# Patient Record
Sex: MALE | Race: WHITE | URBAN - METROPOLITAN AREA
[De-identification: names, ages, dates, MRNs, and addresses within clinical notes are randomized per-mention and may not be internally consistent; named-entity substitution may affect disease eponyms.]

---

## 2020-02-28 ENCOUNTER — HISTORICAL (OUTPATIENT)
Dept: RADIOLOGY | Facility: HOSPITAL | Age: 45
End: 2020-02-28

## 2020-12-08 ENCOUNTER — HISTORICAL (OUTPATIENT)
Dept: RADIOLOGY | Facility: HOSPITAL | Age: 45
End: 2020-12-08

## 2024-04-08 ENCOUNTER — HOSPITAL ENCOUNTER (OUTPATIENT)
Dept: RADIOLOGY | Facility: HOSPITAL | Age: 49
Discharge: HOME | End: 2024-04-08
Attending: FAMILY MEDICINE
Payer: COMMERCIAL

## 2024-04-08 DIAGNOSIS — J20.9 ACUTE BRONCHITIS: ICD-10-CM

## 2024-04-08 PROCEDURE — 71046 X-RAY EXAM CHEST 2 VIEWS: CPT | Mod: TC

## 2024-04-10 ENCOUNTER — HOSPITAL ENCOUNTER (EMERGENCY)
Facility: HOSPITAL | Age: 49
Discharge: HOME | End: 2024-04-10
Attending: STUDENT IN AN ORGANIZED HEALTH CARE EDUCATION/TRAINING PROGRAM
Payer: COMMERCIAL

## 2024-04-10 VITALS
HEART RATE: 1 BPM | TEMPERATURE: 98 F | HEIGHT: 67 IN | RESPIRATION RATE: 20 BRPM | OXYGEN SATURATION: 99 % | BODY MASS INDEX: 32.33 KG/M2 | WEIGHT: 206 LBS | DIASTOLIC BLOOD PRESSURE: 96 MMHG | SYSTOLIC BLOOD PRESSURE: 133 MMHG

## 2024-04-10 DIAGNOSIS — I10 HYPERTENSION: ICD-10-CM

## 2024-04-10 DIAGNOSIS — R03.0 ELEVATED BLOOD PRESSURE READING: Primary | ICD-10-CM

## 2024-04-10 LAB
ABS NEUT CALC (OHS): 0.8 X10(3)/MCL (ref 2.1–9.2)
ALBUMIN SERPL-MCNC: 4.3 G/DL (ref 3.5–5)
ALBUMIN/GLOB SERPL: 1.1 RATIO (ref 1.1–2)
ALP SERPL-CCNC: 64 UNIT/L (ref 40–150)
ALT SERPL-CCNC: 37 UNIT/L (ref 0–55)
AST SERPL-CCNC: 22 UNIT/L (ref 5–34)
BILIRUB SERPL-MCNC: 0.5 MG/DL
BUN SERPL-MCNC: 11 MG/DL (ref 8.9–20.6)
CALCIUM SERPL-MCNC: 9.7 MG/DL (ref 8.4–10.2)
CHLORIDE SERPL-SCNC: 106 MMOL/L (ref 98–107)
CO2 SERPL-SCNC: 24 MMOL/L (ref 22–29)
CREAT SERPL-MCNC: 0.98 MG/DL (ref 0.73–1.18)
EOSINOPHIL NFR BLD MANUAL: 0.08 X10(3)/MCL (ref 0–0.9)
EOSINOPHIL NFR BLD MANUAL: 3 % (ref 0–8)
ERYTHROCYTE [DISTWIDTH] IN BLOOD BY AUTOMATED COUNT: 12.7 % (ref 11.5–17)
FLUAV AG UPPER RESP QL IA.RAPID: NOT DETECTED
FLUBV AG UPPER RESP QL IA.RAPID: NOT DETECTED
GFR SERPLBLD CREATININE-BSD FMLA CKD-EPI: >60 MLS/MIN/1.73/M2
GLOBULIN SER-MCNC: 4 GM/DL (ref 2.4–3.5)
GLUCOSE SERPL-MCNC: 103 MG/DL (ref 74–100)
HCT VFR BLD AUTO: 45 % (ref 42–52)
HGB BLD-MCNC: 15.7 G/DL (ref 14–18)
LYMPHOCYTES NFR BLD MANUAL: 1.1 X10(3)/MCL
LYMPHOCYTES NFR BLD MANUAL: 44 % (ref 13–40)
MCH RBC QN AUTO: 29.6 PG (ref 27–31)
MCHC RBC AUTO-ENTMCNC: 34.9 G/DL (ref 33–36)
MCV RBC AUTO: 84.9 FL (ref 80–94)
MONOCYTES NFR BLD MANUAL: 0.53 X10(3)/MCL (ref 0.1–1.3)
MONOCYTES NFR BLD MANUAL: 21 % (ref 2–11)
NEUTROPHILS NFR BLD MANUAL: 32 % (ref 47–80)
NRBC BLD AUTO-RTO: 0 %
OHS QRS DURATION: 90 MS
OHS QTC CALCULATION: 456 MS
PLATELET # BLD AUTO: 56 X10(3)/MCL (ref 130–400)
PLATELET # BLD EST: ABNORMAL 10*3/UL
PMV BLD AUTO: 11.1 FL (ref 7.4–10.4)
POTASSIUM SERPL-SCNC: 3.5 MMOL/L (ref 3.5–5.1)
PROT SERPL-MCNC: 8.3 GM/DL (ref 6.4–8.3)
RBC # BLD AUTO: 5.3 X10(6)/MCL (ref 4.7–6.1)
SARS-COV-2 RNA RESP QL NAA+PROBE: NOT DETECTED
SODIUM SERPL-SCNC: 142 MMOL/L (ref 136–145)
TROPONIN I SERPL-MCNC: <0.01 NG/ML (ref 0–0.04)
WBC # SPEC AUTO: 2.51 X10(3)/MCL (ref 4.5–11.5)

## 2024-04-10 PROCEDURE — 84484 ASSAY OF TROPONIN QUANT: CPT | Performed by: STUDENT IN AN ORGANIZED HEALTH CARE EDUCATION/TRAINING PROGRAM

## 2024-04-10 PROCEDURE — 85027 COMPLETE CBC AUTOMATED: CPT | Performed by: STUDENT IN AN ORGANIZED HEALTH CARE EDUCATION/TRAINING PROGRAM

## 2024-04-10 PROCEDURE — 63600175 PHARM REV CODE 636 W HCPCS: Performed by: STUDENT IN AN ORGANIZED HEALTH CARE EDUCATION/TRAINING PROGRAM

## 2024-04-10 PROCEDURE — 93010 ELECTROCARDIOGRAM REPORT: CPT | Mod: ,,, | Performed by: INTERNAL MEDICINE

## 2024-04-10 PROCEDURE — 0240U COVID/FLU A&B PCR: CPT | Performed by: STUDENT IN AN ORGANIZED HEALTH CARE EDUCATION/TRAINING PROGRAM

## 2024-04-10 PROCEDURE — 99285 EMERGENCY DEPT VISIT HI MDM: CPT | Mod: 25

## 2024-04-10 PROCEDURE — 93005 ELECTROCARDIOGRAM TRACING: CPT

## 2024-04-10 PROCEDURE — 80053 COMPREHEN METABOLIC PANEL: CPT | Performed by: STUDENT IN AN ORGANIZED HEALTH CARE EDUCATION/TRAINING PROGRAM

## 2024-04-10 RX ORDER — LABETALOL HYDROCHLORIDE 5 MG/ML
10 INJECTION, SOLUTION INTRAVENOUS
Status: DISCONTINUED | OUTPATIENT
Start: 2024-04-10 | End: 2024-04-10 | Stop reason: HOSPADM

## 2024-04-10 NOTE — ED PROVIDER NOTES
Encounter Date: 4/10/2024       History     Chief Complaint   Patient presents with    Hypertension     Hypertension and vertigo since last night. Diagnosed with pneumonia on Monday on albuterol and ABX.       Patient is a 48-year-old white gentleman with no self-reported past medical history who presented to the ER today lightheadedness for the last 2 days.  Who states he was diagnosed with pneumonia 2 days prior and was put on azithromycin.  Patient states he has been coughing incessantly and subsequently developed lightheadedness.  He clarified that this is different from dizziness and he does not have any dizziness.  He denies any diplopia, dysarthria, dysphagia, focal deficits, changes in sensation, headaches, vision changes, chest pain, shortness of breath he states he does not not see a doctor on a regular basis not had his blood pressure checked in quite some time.  He states on Monday his doctor advised him to check his pressure frequently which he has been doing and noticed that it has been elevated.  He denies any other complaints.      Review of patient's allergies indicates:  No Known Allergies  History reviewed. No pertinent past medical history.  History reviewed. No pertinent surgical history.  History reviewed. No pertinent family history.  Social History     Tobacco Use    Smoking status: Never    Smokeless tobacco: Never   Substance Use Topics    Alcohol use: Yes     Comment: occasionally    Drug use: Never     Review of Systems   Constitutional:  Negative for chills, fatigue and fever.   HENT:  Negative for congestion, sore throat and trouble swallowing.    Eyes:  Negative for pain and visual disturbance.   Respiratory:  Positive for cough. Negative for shortness of breath and wheezing.    Cardiovascular:  Negative for chest pain and palpitations.   Gastrointestinal:  Negative for abdominal pain, blood in stool, constipation, diarrhea, nausea and vomiting.   Genitourinary:  Negative for dysuria  and hematuria.   Musculoskeletal:  Negative for back pain and myalgias.   Skin:  Negative for rash and wound.   Neurological:  Positive for light-headedness. Negative for dizziness, tremors, seizures, syncope, facial asymmetry, speech difficulty, weakness, numbness and headaches.   Psychiatric/Behavioral:  Negative for confusion. The patient is not nervous/anxious.        Physical Exam     Initial Vitals [04/10/24 0844]   BP Pulse Resp Temp SpO2   (!) 176/115 76 18 98.1 °F (36.7 °C) 97 %      MAP       --         Physical Exam    Nursing note and vitals reviewed.  Constitutional: He appears well-developed and well-nourished. No distress.   HENT:   Head: Normocephalic and atraumatic.   Eyes: Conjunctivae and EOM are normal. Right eye exhibits no discharge. Left eye exhibits no discharge. No scleral icterus.   Neck: No tracheal deviation present.   Normal range of motion.  Cardiovascular:  Normal rate, regular rhythm and normal heart sounds.     Exam reveals no gallop and no friction rub.       No murmur heard.  Pulmonary/Chest: Breath sounds normal. No respiratory distress. He has no wheezes. He has no rhonchi. He has no rales.   Abdominal: Abdomen is soft. He exhibits no distension. There is no abdominal tenderness. There is no rebound and no guarding.   Musculoskeletal:         General: No edema. Normal range of motion.      Cervical back: Normal range of motion.     Neurological: He is alert and oriented to person, place, and time. He has normal strength. No cranial nerve deficit or sensory deficit. GCS score is 15. GCS eye subscore is 4. GCS verbal subscore is 5. GCS motor subscore is 6.   CN II-XII intact bilaterally, PERRLA, EOMI, AO, no facial asymmetry noted, no abnormalities of vision loss or peripheral vision loss, strength 5/5 x 4 extremities, sensation intact throughout, no focal neurological deficits noted on exam.  Gait stable without assistance. Negative Pronator drift bilaterally.       Skin: Skin is  warm and dry. No rash and no abscess noted. No erythema. No pallor.   Psychiatric: His behavior is normal. Judgment normal.         ED Course   Procedures  Labs Reviewed   COMPREHENSIVE METABOLIC PANEL - Abnormal; Notable for the following components:       Result Value    Glucose Level 103 (*)     Globulin 4.0 (*)     All other components within normal limits   CBC WITH DIFFERENTIAL - Abnormal; Notable for the following components:    WBC 2.51 (*)     Platelet 56 (*)     MPV 11.1 (*)     All other components within normal limits   MANUAL DIFFERENTIAL - Abnormal; Notable for the following components:    Neutrophils % 32 (*)     Lymphs % 44 (*)     Monocytes % 21 (*)     Neutrophils Abs Calc 0.8032 (*)     Platelets Decreased (*)     All other components within normal limits   COVID/FLU A&B PCR - Normal    Narrative:     The Xpert Xpress SARS-CoV-2/FLU/RSV plus is a rapid, multiplexed real-time PCR test intended for the simultaneous qualitative detection and differentiation of SARS-CoV-2, Influenza A, Influenza B, and respiratory syncytial virus (RSV) viral RNA in either nasopharyngeal swab or nasal swab specimens.         TROPONIN I - Normal   CBC W/ AUTO DIFFERENTIAL    Narrative:     The following orders were created for panel order CBC Auto Differential.  Procedure                               Abnormality         Status                     ---------                               -----------         ------                     CBC with Differential[[id number]]       Abnormal            Final result               Manual Differential[[id number]]         Abnormal            Final result                 Please view results for these tests on the individual orders.     EKG Readings: (Independently Interpreted)   Initial Reading: No STEMI. Rhythm: Normal Sinus Rhythm. Heart Rate: 73. Ectopy: No Ectopy. Conduction: Normal. ST Segments: Normal ST Segments. T Waves: Normal. Axis: Left Axis Deviation.       Imaging Results               X-Ray Chest 1 View (Final result)  Result time 04/10/24 09:36:28      Final result by Demetris Hernandez MD (04/10/24 09:36:28)                   Impression:      No acute chest disease is identified.      Electronically signed by: Demetris Hernandez  Date:    04/10/2024  Time:    09:36               Narrative:    EXAMINATION:  XR CHEST 1 VIEW    CLINICAL HISTORY:  cough;, .    COMPARISON:  April 8, 2024    FINDINGS:  No alveolar consolidation, effusion, or pneumothorax is seen.   The thoracic aorta is normal  cardiac silhouette, central pulmonary vessels and mediastinum are normal in size and are grossly unremarkable.   visualized osseous structures are grossly unremarkable.                                       Medications   labetaloL injection 10 mg (10 mg Intravenous Not Given 4/10/24 0912)     Medical Decision Making  Differentials:  Hypertension, CVA/TIA, hypertensive emergency, hypertensive urgency   History in his the patient   48-year-old well-appearing male with no past medical history presents due to elevated blood pressure readings and lightheadedness.  He states it all seems to be attributed to starting albuterol.  EKG shows no ischemic changes, troponin negative and basic labs reassuring.  He was recently diagnosed with pneumonia in his chest x-ray today looks to be improved with no signs of infiltrate.  Advised him to continue taking his antibiotics.  Full neurological exam was performed which showed no deficits and low suspicion for CVA/TIA.  He also denies any red flag symptoms of a CVA.  Blood pressure improved without intervention down to 123/94.  Advised him to take a blood pressure log going forward and to try without the albuterol to see if it is truly due to this medication.  All questions were answered in layman's terms, no evidence of hypertensive urgency or emergency was identified and ER return precautions were discussed.  Close follow up with the PCP is already scheduled  advised him to make every effort to attend.    Amount and/or Complexity of Data Reviewed  Labs: ordered. Decision-making details documented in ED Course.  Radiology: ordered. Decision-making details documented in ED Course.  ECG/medicine tests: ordered and independent interpretation performed. Decision-making details documented in ED Course.    Risk  Prescription drug management.                                      Clinical Impression:  Final diagnoses:  [I10] Hypertension  [R03.0] Elevated blood pressure reading (Primary)          ED Disposition Condition    Discharge Stable          ED Prescriptions    None       Follow-up Information       Follow up With Specialties Details Why Contact Info    [facility] - Emergency Dept Emergency Medicine  If symptoms worsen [address] [place] [address] [phone]    Leona Burton MD Family Medicine Schedule an appointment as soon as possible for a visit   [address] [place] [phone]               Emmanuel Dang MD  04/10/24 8841

## 2024-04-10 NOTE — ED NOTES
Ambulated to room 2 with steady gait. States his BP has been high since last night. Pt BP is 176/115 upon arrival. States he has been lightheaded since yesterday and appears anxious. Pt resting in bed. Wife at bedside.

## 2024-04-18 ENCOUNTER — HOSPITAL ENCOUNTER (INPATIENT)
Facility: HOSPITAL | Age: 49
LOS: 3 days | Discharge: HOME | DRG: 813 | End: 2024-04-21
Attending: EMERGENCY MEDICINE | Admitting: INTERNAL MEDICINE
Payer: COMMERCIAL

## 2024-04-18 ENCOUNTER — HOSPITAL ENCOUNTER (OUTPATIENT)
Dept: RADIOLOGY | Facility: HOSPITAL | Age: 49
Discharge: HOME | End: 2024-04-18
Attending: FAMILY MEDICINE
Payer: COMMERCIAL

## 2024-04-18 DIAGNOSIS — D69.3 ACUTE IDIOPATHIC THROMBOCYTOPENIC PURPURA: Primary | ICD-10-CM

## 2024-04-18 DIAGNOSIS — J18.9 UNRESOLVED PNEUMONIA: ICD-10-CM

## 2024-04-18 DIAGNOSIS — D69.6 THROMBOCYTOPENIA: ICD-10-CM

## 2024-04-18 DIAGNOSIS — R07.9 CHEST PAIN: ICD-10-CM

## 2024-04-18 LAB
ABO + RH BLD: NORMAL
ABORH RETYPE: NORMAL
ALBUMIN SERPL-MCNC: 4.1 G/DL (ref 3.5–5)
ALBUMIN/GLOB SERPL: 1 RATIO (ref 1.1–2)
ALP SERPL-CCNC: 72 UNIT/L (ref 40–150)
ALT SERPL-CCNC: 27 UNIT/L (ref 0–55)
APTT PPP: 31.3 SECONDS (ref 23.2–33.7)
AST SERPL-CCNC: 20 UNIT/L (ref 5–34)
BASOPHILS # BLD AUTO: 0.05 X10(3)/MCL
BASOPHILS NFR BLD AUTO: 1.2 %
BILIRUB SERPL-MCNC: 0.5 MG/DL
BLD PROD TYP BPU: NORMAL
BLOOD UNIT EXPIRATION DATE: NORMAL
BLOOD UNIT TYPE CODE: 6200
BUN SERPL-MCNC: 13.4 MG/DL (ref 8.9–20.6)
CALCIUM SERPL-MCNC: 9.7 MG/DL (ref 8.4–10.2)
CHLORIDE SERPL-SCNC: 106 MMOL/L (ref 98–107)
CO2 SERPL-SCNC: 21 MMOL/L (ref 22–29)
CREAT SERPL-MCNC: 1.01 MG/DL (ref 0.73–1.18)
CROSSMATCH INTERPRETATION: NORMAL
DISPENSE STATUS: NORMAL
EOSINOPHIL # BLD AUTO: 0.11 X10(3)/MCL (ref 0–0.9)
EOSINOPHIL NFR BLD AUTO: 2.5 %
ERYTHROCYTE [DISTWIDTH] IN BLOOD BY AUTOMATED COUNT: 12.3 % (ref 11.5–17)
FIBRINOGEN PPP-MCNC: 361 MG/DL (ref 210–463)
FOLATE SERPL-MCNC: 9.2 NG/ML (ref 7–31.4)
GFR SERPLBLD CREATININE-BSD FMLA CKD-EPI: >60 MLS/MIN/1.73/M2
GLOBULIN SER-MCNC: 4 GM/DL (ref 2.4–3.5)
GLUCOSE SERPL-MCNC: 82 MG/DL (ref 74–100)
GROUP & RH: NORMAL
HAPTOGLOB SERPL-MCNC: 109 MG/DL (ref 14–258)
HCT VFR BLD AUTO: 43.6 % (ref 42–52)
HGB BLD-MCNC: 15.1 G/DL (ref 14–18)
IMM GRANULOCYTES # BLD AUTO: 0.03 X10(3)/MCL (ref 0–0.04)
IMM GRANULOCYTES NFR BLD AUTO: 0.7 %
INDIRECT COOMBS: NORMAL
INR PPP: 1
LDH SERPL-CCNC: 211 U/L (ref 125–220)
LYMPHOCYTES # BLD AUTO: 1.53 X10(3)/MCL (ref 0.6–4.6)
LYMPHOCYTES NFR BLD AUTO: 35.3 %
MCH RBC QN AUTO: 29.5 PG (ref 27–31)
MCHC RBC AUTO-ENTMCNC: 34.6 G/DL (ref 33–36)
MCV RBC AUTO: 85.2 FL (ref 80–94)
MONOCYTES # BLD AUTO: 0.68 X10(3)/MCL (ref 0.1–1.3)
MONOCYTES NFR BLD AUTO: 15.7 %
NEUTROPHILS # BLD AUTO: 1.94 X10(3)/MCL (ref 2.1–9.2)
NEUTROPHILS NFR BLD AUTO: 44.6 %
NRBC BLD AUTO-RTO: 0 %
PLATELET # BLD AUTO: 7 X10(3)/MCL (ref 130–400)
PLATELET # BLD EST: ABNORMAL 10*3/UL
PLATELETS.RETICULATED NFR BLD AUTO: 27.1 % (ref 0.9–11.2)
PMV BLD AUTO: ABNORMAL FL
POTASSIUM SERPL-SCNC: 3.9 MMOL/L (ref 3.5–5.1)
PROT SERPL-MCNC: 8.1 GM/DL (ref 6.4–8.3)
PROTHROMBIN TIME: 13 SECONDS (ref 12.5–14.5)
RBC # BLD AUTO: 5.12 X10(6)/MCL (ref 4.7–6.1)
RBC MORPH BLD: NORMAL
RET# (OHS): 0.1 X10E6/UL (ref 0.03–0.1)
RETICULOCYTE COUNT AUTOMATED (OLG): 2.02 % (ref 1.1–2.1)
SODIUM SERPL-SCNC: 138 MMOL/L (ref 136–145)
SPECIMEN OUTDATE: NORMAL
UNIT NUMBER: NORMAL
VIT B12 SERPL-MCNC: 371 PG/ML (ref 213–816)
WBC # SPEC AUTO: 4.34 X10(3)/MCL (ref 4.5–11.5)

## 2024-04-18 PROCEDURE — 36430 TRANSFUSION BLD/BLD COMPNT: CPT

## 2024-04-18 PROCEDURE — 85045 AUTOMATED RETICULOCYTE COUNT: CPT | Performed by: EMERGENCY MEDICINE

## 2024-04-18 PROCEDURE — 25000003 PHARM REV CODE 250: Performed by: INTERNAL MEDICINE

## 2024-04-18 PROCEDURE — 63600175 PHARM REV CODE 636 W HCPCS: Performed by: INTERNAL MEDICINE

## 2024-04-18 PROCEDURE — 86850 RBC ANTIBODY SCREEN: CPT | Performed by: EMERGENCY MEDICINE

## 2024-04-18 PROCEDURE — 85025 COMPLETE CBC W/AUTO DIFF WBC: CPT | Performed by: NURSE PRACTITIONER

## 2024-04-18 PROCEDURE — 80053 COMPREHEN METABOLIC PANEL: CPT | Performed by: NURSE PRACTITIONER

## 2024-04-18 PROCEDURE — 25000003 PHARM REV CODE 250: Performed by: EMERGENCY MEDICINE

## 2024-04-18 PROCEDURE — 99285 EMERGENCY DEPT VISIT HI MDM: CPT | Mod: 25

## 2024-04-18 PROCEDURE — 63600175 PHARM REV CODE 636 W HCPCS: Performed by: EMERGENCY MEDICINE

## 2024-04-18 PROCEDURE — C9113 INJ PANTOPRAZOLE SODIUM, VIA: HCPCS | Performed by: EMERGENCY MEDICINE

## 2024-04-18 PROCEDURE — 71046 X-RAY EXAM CHEST 2 VIEWS: CPT | Mod: TC

## 2024-04-18 PROCEDURE — 85730 THROMBOPLASTIN TIME PARTIAL: CPT | Performed by: EMERGENCY MEDICINE

## 2024-04-18 PROCEDURE — 83010 ASSAY OF HAPTOGLOBIN QUANT: CPT | Performed by: EMERGENCY MEDICINE

## 2024-04-18 PROCEDURE — 21400001 HC TELEMETRY ROOM

## 2024-04-18 PROCEDURE — 85610 PROTHROMBIN TIME: CPT | Performed by: EMERGENCY MEDICINE

## 2024-04-18 PROCEDURE — 30233R0 TRANSFUSION OF AUTOLOGOUS PLATELETS INTO PERIPHERAL VEIN, PERCUTANEOUS APPROACH: ICD-10-PCS | Performed by: INTERNAL MEDICINE

## 2024-04-18 PROCEDURE — 11000001 HC ACUTE MED/SURG PRIVATE ROOM

## 2024-04-18 PROCEDURE — 86023 IMMUNOGLOBULIN ASSAY: CPT | Performed by: EMERGENCY MEDICINE

## 2024-04-18 PROCEDURE — P9035 PLATELET PHERES LEUKOREDUCED: HCPCS | Performed by: EMERGENCY MEDICINE

## 2024-04-18 PROCEDURE — 82746 ASSAY OF FOLIC ACID SERUM: CPT | Performed by: EMERGENCY MEDICINE

## 2024-04-18 PROCEDURE — 82607 VITAMIN B-12: CPT | Performed by: EMERGENCY MEDICINE

## 2024-04-18 PROCEDURE — 85384 FIBRINOGEN ACTIVITY: CPT | Performed by: EMERGENCY MEDICINE

## 2024-04-18 PROCEDURE — 83615 LACTATE (LD) (LDH) ENZYME: CPT | Performed by: EMERGENCY MEDICINE

## 2024-04-18 RX ORDER — ACETAMINOPHEN 325 MG/1
650 TABLET ORAL EVERY 4 HOURS PRN
Status: DISCONTINUED | OUTPATIENT
Start: 2024-04-18 | End: 2024-04-21 | Stop reason: HOSPADM

## 2024-04-18 RX ORDER — LOSARTAN POTASSIUM 50 MG/1
50 TABLET ORAL DAILY
Status: DISCONTINUED | OUTPATIENT
Start: 2024-04-18 | End: 2024-04-21 | Stop reason: HOSPADM

## 2024-04-18 RX ORDER — HYDROCODONE BITARTRATE AND ACETAMINOPHEN 500; 5 MG/1; MG/1
TABLET ORAL
Status: DISCONTINUED | OUTPATIENT
Start: 2024-04-18 | End: 2024-04-19

## 2024-04-18 RX ORDER — ONDANSETRON HYDROCHLORIDE 2 MG/ML
4 INJECTION, SOLUTION INTRAVENOUS EVERY 4 HOURS PRN
Status: DISCONTINUED | OUTPATIENT
Start: 2024-04-18 | End: 2024-04-21 | Stop reason: HOSPADM

## 2024-04-18 RX ORDER — FOLIC ACID 1 MG/1
1 TABLET ORAL DAILY
Status: DISCONTINUED | OUTPATIENT
Start: 2024-04-18 | End: 2024-04-21 | Stop reason: HOSPADM

## 2024-04-18 RX ORDER — DIPHENHYDRAMINE HYDROCHLORIDE 50 MG/ML
25 INJECTION INTRAMUSCULAR; INTRAVENOUS ONCE
Status: COMPLETED | OUTPATIENT
Start: 2024-04-18 | End: 2024-04-18

## 2024-04-18 RX ORDER — POLYETHYLENE GLYCOL 3350 17 G/17G
17 POWDER, FOR SOLUTION ORAL 2 TIMES DAILY PRN
Status: DISCONTINUED | OUTPATIENT
Start: 2024-04-18 | End: 2024-04-21 | Stop reason: HOSPADM

## 2024-04-18 RX ORDER — AMOXICILLIN 250 MG
2 CAPSULE ORAL 2 TIMES DAILY PRN
Status: DISCONTINUED | OUTPATIENT
Start: 2024-04-18 | End: 2024-04-21 | Stop reason: HOSPADM

## 2024-04-18 RX ORDER — LABETALOL HYDROCHLORIDE 5 MG/ML
10 INJECTION, SOLUTION INTRAVENOUS EVERY 4 HOURS PRN
Status: DISCONTINUED | OUTPATIENT
Start: 2024-04-18 | End: 2024-04-21 | Stop reason: HOSPADM

## 2024-04-18 RX ORDER — CLONIDINE HYDROCHLORIDE 0.1 MG/1
0.1 TABLET ORAL 3 TIMES DAILY PRN
Status: DISCONTINUED | OUTPATIENT
Start: 2024-04-18 | End: 2024-04-21 | Stop reason: HOSPADM

## 2024-04-18 RX ORDER — PROCHLORPERAZINE EDISYLATE 5 MG/ML
5 INJECTION INTRAMUSCULAR; INTRAVENOUS EVERY 6 HOURS PRN
Status: DISCONTINUED | OUTPATIENT
Start: 2024-04-18 | End: 2024-04-21 | Stop reason: HOSPADM

## 2024-04-18 RX ORDER — DEXAMETHASONE SODIUM PHOSPHATE 4 MG/ML
40 INJECTION, SOLUTION INTRA-ARTICULAR; INTRALESIONAL; INTRAMUSCULAR; INTRAVENOUS; SOFT TISSUE EVERY 24 HOURS
Status: DISCONTINUED | OUTPATIENT
Start: 2024-04-18 | End: 2024-04-18

## 2024-04-18 RX ORDER — HYDRALAZINE HYDROCHLORIDE 20 MG/ML
10 INJECTION INTRAMUSCULAR; INTRAVENOUS EVERY 4 HOURS PRN
Status: DISCONTINUED | OUTPATIENT
Start: 2024-04-18 | End: 2024-04-21 | Stop reason: HOSPADM

## 2024-04-18 RX ORDER — TALC
6 POWDER (GRAM) TOPICAL NIGHTLY PRN
Status: DISCONTINUED | OUTPATIENT
Start: 2024-04-18 | End: 2024-04-21 | Stop reason: HOSPADM

## 2024-04-18 RX ORDER — ALUMINUM HYDROXIDE, MAGNESIUM HYDROXIDE, AND SIMETHICONE 1200; 120; 1200 MG/30ML; MG/30ML; MG/30ML
30 SUSPENSION ORAL 4 TIMES DAILY PRN
Status: DISCONTINUED | OUTPATIENT
Start: 2024-04-18 | End: 2024-04-21 | Stop reason: HOSPADM

## 2024-04-18 RX ORDER — SODIUM CHLORIDE 0.9 % (FLUSH) 0.9 %
10 SYRINGE (ML) INJECTION
Status: DISCONTINUED | OUTPATIENT
Start: 2024-04-18 | End: 2024-04-21 | Stop reason: HOSPADM

## 2024-04-18 RX ORDER — PANTOPRAZOLE SODIUM 40 MG/10ML
40 INJECTION, POWDER, LYOPHILIZED, FOR SOLUTION INTRAVENOUS
Status: COMPLETED | OUTPATIENT
Start: 2024-04-18 | End: 2024-04-18

## 2024-04-18 RX ORDER — PANTOPRAZOLE SODIUM 40 MG/1
40 TABLET, DELAYED RELEASE ORAL DAILY
Status: DISCONTINUED | OUTPATIENT
Start: 2024-04-19 | End: 2024-04-21 | Stop reason: HOSPADM

## 2024-04-18 RX ADMIN — FOLIC ACID 1 MG: 1 TABLET ORAL at 08:04

## 2024-04-18 RX ADMIN — DEXAMETHASONE SODIUM PHOSPHATE 40 MG: 4 INJECTION, SOLUTION INTRA-ARTICULAR; INTRALESIONAL; INTRAMUSCULAR; INTRAVENOUS; SOFT TISSUE at 05:04

## 2024-04-18 RX ADMIN — PANTOPRAZOLE SODIUM 40 MG: 40 INJECTION, POWDER, LYOPHILIZED, FOR SOLUTION INTRAVENOUS at 05:04

## 2024-04-18 RX ADMIN — LOSARTAN POTASSIUM 50 MG: 50 TABLET, FILM COATED ORAL at 08:04

## 2024-04-18 RX ADMIN — DIPHENHYDRAMINE HYDROCHLORIDE 25 MG: 50 INJECTION INTRAMUSCULAR; INTRAVENOUS at 08:04

## 2024-04-18 RX ADMIN — THERA TABS 1 TABLET: TAB at 08:04

## 2024-04-18 RX ADMIN — CLONIDINE HYDROCHLORIDE 0.1 MG: 0.1 TABLET ORAL at 08:04

## 2024-04-18 NOTE — NURSING
Nurses Note -- 4 Eyes      4/18/2024   6:51 PM      Skin assessed during: Admit      [x] No Altered Skin Integrity Present    []Prevention Measures Documented      [] Yes- Altered Skin Integrity Present or Discovered   [] LDA Added if Not in Epic (Describe Wound)   [] New Altered Skin Integrity was Present on Admit and Documented in LDA   [] Wound Image Taken    Wound Care Consulted? No    Attending Nurse:  Asher Lu RN/Staff Member:  Reid Borrero RN

## 2024-04-18 NOTE — FIRST PROVIDER EVALUATION
Medical screening examination initiated.  I have conducted a focused provider triage encounter, findings are as follows:    Brief history of present illness:  Patient states that his PCP sent to the ED due to outpatient labs showing a low platelet count.     There were no vitals filed for this visit.    Pertinent physical exam:  Awake, alert, ambulatory    Brief workup plan:  Labs    Preliminary workup initiated; this workup will be continued and followed by the physician or advanced practice provider that is assigned to the patient when roomed.

## 2024-04-18 NOTE — ED PROVIDER NOTES
Encounter Date: 4/18/2024    SCRIBE #1 NOTE: I, Phillip Francis, am scribing for, and in the presence of,  Major Burks MD. I have scribed the following portions of the note - Other sections scribed: HPI, ROS, PE.       History     Chief Complaint   Patient presents with    Abnormal Lab     Sent by Dr. Burton for low platelet count. Denies symptoms at this time     Pt is a 48 y.o. male with no notable pMHx presenting to the ED with a low platelet count, referred to the ED by Dr. Burton. Pt's only complaint at the moment is intermittent light headedness. Pt notes easily bruising recently, but no current bruising reported. He reports no significant family medical history and his occupational history does not point towards any obvious exposure events. He reports has no recent medication changes except that he recently started using an inhaler. He denies nosebleeds, bleeding when brushing his teeth, blood in his stool, cold sweats, and unexpected weight loss. Pt is a  .     The history is provided by the patient. No  was used.     Review of patient's allergies indicates:  No Known Allergies  No past medical history on file.  No past surgical history on file.  No family history on file.  Social History     Tobacco Use    Smoking status: Never    Smokeless tobacco: Never   Substance Use Topics    Alcohol use: Yes     Comment: occasionally    Drug use: Never     Review of Systems   Constitutional:  Negative for chills and unexpected weight change.   HENT:  Negative for nosebleeds.    Gastrointestinal:  Negative for blood in stool.   Musculoskeletal:         No arm or groin swelling   Skin:         Easy bruising   Neurological:  Positive for light-headedness.       Physical Exam     Initial Vitals [04/18/24 1440]   BP Pulse Resp Temp SpO2   (!) 149/98 86 16 98 °F (36.7 °C) 97 %      MAP       --         Physical Exam    Constitutional: He appears well-developed and well-nourished. No  distress.   HENT:   Head: Normocephalic and atraumatic.   Eyes: Conjunctivae and EOM are normal. Pupils are equal, round, and reactive to light. Right eye exhibits no discharge. Left eye exhibits no discharge. No scleral icterus.   Neck: No stridor present. No tracheal deviation present.   Pulmonary/Chest: No stridor.   Abdominal: He exhibits no distension.   Musculoskeletal:         General: No edema. Normal range of motion.     Neurological: He is alert and oriented to person, place, and time. He has normal strength and normal reflexes. No cranial nerve deficit.   Skin: Skin is warm and dry. No rash noted. No erythema. No pallor.   No abnormal bruising    Psychiatric: He has a normal mood and affect. His behavior is normal. Judgment and thought content normal.         ED Course   Procedures  Labs Reviewed   COMPREHENSIVE METABOLIC PANEL - Abnormal; Notable for the following components:       Result Value    Carbon Dioxide 21 (*)     Globulin 4.0 (*)     Albumin/Globulin Ratio 1.0 (*)     All other components within normal limits   CBC WITH DIFFERENTIAL - Abnormal; Notable for the following components:    WBC 4.34 (*)     Platelet 7 (*)     Neut # 1.94 (*)     IPF 27.1 (*)     All other components within normal limits   BLOOD SMEAR MICROSCOPIC EXAM (OLG) - Abnormal; Notable for the following components:    Platelets Decreased (*)     All other components within normal limits   RETICULOCYTES - Abnormal; Notable for the following components:    RET# 0.1032 (*)     All other components within normal limits   CBC W/ AUTO DIFFERENTIAL    Narrative:     The following orders were created for panel order CBC Auto Differential.  Procedure                               Abnormality         Status                     ---------                               -----------         ------                     CBC with Differential[[id number]]       Abnormal            Final result                 Please view results for these tests  on the individual orders.   PATH REVIEW OF BLOOD SMEAR   APTT   PROTIME-INR   FIBRINOGEN   LACTATE DEHYDROGENASE   HAPTOGLOBIN   PLT. AUTOANTIBODY, CELL BOUND (DIRECT)   VITAMIN B12   FOLATE   PREPARE PLATELETS (DOSE) SOFT          Imaging Results    None          Medications   pantoprazole injection 40 mg (has no administration in time range)   0.9%  NaCl infusion (for blood administration) (has no administration in time range)   dexAMETHasone 40 mg in sodium chloride 0.9% 50 mL IVPB (has no administration in time range)     Medical Decision Making  The differential diagnosis includes, but is not limited to, thrombocytopenia, pancytopenia, ITP.      Amount and/or Complexity of Data Reviewed  Labs: ordered.    Risk  Prescription drug management.  Decision regarding hospitalization.            Scribe Attestation:   Scribe #1: I performed the above scribed service and the documentation accurately describes the services I performed. I attest to the accuracy of the note.    Attending Attestation:           Physician Attestation for Scribe:  Physician Attestation Statement for Scribe #1: I, Major Burks MD, reviewed documentation, as scribed by Phillip Francis in my presence, and it is both accurate and complete.             ED Course as of 04/18/24 1650   Thu Apr 18, 2024   1635 Spoke with Dr. Sheila Kuhn from Oncology; plan is to order more labs, agreed upon admitting pt. [MM]   [id number] Hospitalist paged [MM]      ED Course User Index  [MM] Heather Quintero                             Clinical Impression:  Final diagnoses:  [D69.6] Thrombocytopenia          ED Disposition Condition    Admit                 Major Burks MD  04/18/24 1956

## 2024-04-19 LAB
ALBUMIN % SPEP (OHS): 45.18
ALBUMIN SERPL-MCNC: 3.9 G/DL (ref 3.5–5)
ALBUMIN SERPL-MCNC: 4.5 G/DL (ref 3.5–5)
ALBUMIN/GLOB SERPL: 0.8 RATIO (ref 1.1–2)
ALBUMIN/GLOB SERPL: 1 RATIO (ref 1.1–2)
ALP SERPL-CCNC: 76 UNIT/L (ref 40–150)
ALPHA 1 GLOB (OHS): 0.28 GM/DL
ALPHA 1 GLOB% (OHS): 3.25
ALPHA 2 GLOB % (OHS): 9.14
ALPHA 2 GLOB (OHS): 0.8 GM/DL
ALT SERPL-CCNC: 30 UNIT/L (ref 0–55)
APTT PPP: 29.9 SECONDS (ref 23.2–33.7)
AST SERPL-CCNC: 19 UNIT/L (ref 5–34)
BASOPHILS # BLD AUTO: 0.01 X10(3)/MCL
BASOPHILS NFR BLD AUTO: 0.1 %
BETA GLOB (OHS): 1.42 GM/DL
BETA GLOB% (OHS): 16.31
BILIRUB SERPL-MCNC: 0.7 MG/DL
BUN SERPL-MCNC: 12 MG/DL (ref 8.9–20.6)
CALCIUM SERPL-MCNC: 10.5 MG/DL (ref 8.4–10.2)
CHLORIDE SERPL-SCNC: 103 MMOL/L (ref 98–107)
CO2 SERPL-SCNC: 20 MMOL/L (ref 22–29)
CREAT SERPL-MCNC: 1.2 MG/DL (ref 0.73–1.18)
CRP SERPL-MCNC: 3 MG/L
EOSINOPHIL # BLD AUTO: 0 X10(3)/MCL (ref 0–0.9)
EOSINOPHIL NFR BLD AUTO: 0 %
ERYTHROCYTE [DISTWIDTH] IN BLOOD BY AUTOMATED COUNT: 12.3 % (ref 11.5–17)
ERYTHROCYTE [SEDIMENTATION RATE] IN BLOOD: 17 MM/HR (ref 0–15)
FIBRINOGEN PPP-MCNC: 423 MG/DL (ref 210–463)
GAMMA GLOBULIN % (OHS): 26.12
GAMMA GLOBULIN (OHS): 2.27 GM/DL
GFR SERPLBLD CREATININE-BSD FMLA CKD-EPI: >60 MLS/MIN/1.73/M2
GLOBULIN SER-MCNC: 4.7 GM/DL (ref 2.4–3.5)
GLOBULIN SER-MCNC: 4.8 GM/DL (ref 2.4–3.5)
GLUCOSE SERPL-MCNC: 148 MG/DL (ref 74–100)
HAV IGM SERPL QL IA: NONREACTIVE
HBV CORE IGM SERPL QL IA: NONREACTIVE
HBV SURFACE AG SERPL QL IA: NONREACTIVE
HCT VFR BLD AUTO: 47.4 % (ref 42–52)
HCV AB SERPL QL IA: NONREACTIVE
HEMATOLOGIST REVIEW: NORMAL
HGB BLD-MCNC: 16.4 G/DL (ref 14–18)
HIV 1+2 AB+HIV1 P24 AG SERPL QL IA: NONREACTIVE
IGA SERPL-MCNC: 33 MG/DL (ref 63–484)
IGG SERPL-MCNC: 2227 MG/DL (ref 540–1822)
IGM SERPL-MCNC: 181 MG/DL (ref 22–240)
IMM GRANULOCYTES # BLD AUTO: 0.03 X10(3)/MCL (ref 0–0.04)
IMM GRANULOCYTES NFR BLD AUTO: 0.4 %
INR PPP: 1
LYMPHOCYTES # BLD AUTO: 0.75 X10(3)/MCL (ref 0.6–4.6)
LYMPHOCYTES NFR BLD AUTO: 11.2 %
M SPIKE % (OHS): ABNORMAL
M SPIKE (OHS): ABNORMAL
MAGNESIUM SERPL-MCNC: 2.2 MG/DL (ref 1.6–2.6)
MCH RBC QN AUTO: 29.5 PG (ref 27–31)
MCHC RBC AUTO-ENTMCNC: 34.6 G/DL (ref 33–36)
MCV RBC AUTO: 85.3 FL (ref 80–94)
MONOCYTES # BLD AUTO: 0.12 X10(3)/MCL (ref 0.1–1.3)
MONOCYTES NFR BLD AUTO: 1.8 %
NEUTROPHILS # BLD AUTO: 5.81 X10(3)/MCL (ref 2.1–9.2)
NEUTROPHILS NFR BLD AUTO: 86.5 %
NRBC BLD AUTO-RTO: 0 %
PATH REV: NORMAL
PHOSPHATE SERPL-MCNC: 2.7 MG/DL (ref 2.3–4.7)
PLATELET # BLD AUTO: 18 X10(3)/MCL (ref 130–400)
PLATELETS.RETICULATED NFR BLD AUTO: 17.2 % (ref 0.9–11.2)
PMV BLD AUTO: 12.8 FL (ref 7.4–10.4)
POTASSIUM SERPL-SCNC: 3.6 MMOL/L (ref 3.5–5.1)
PROT SERPL-MCNC: 8.7 GM/DL (ref 6.4–8.3)
PROT SERPL-MCNC: 9.2 GM/DL (ref 6.4–8.3)
PROTHROMBIN TIME: 13 SECONDS (ref 12.5–14.5)
RBC # BLD AUTO: 5.56 X10(6)/MCL (ref 4.7–6.1)
SODIUM SERPL-SCNC: 138 MMOL/L (ref 136–145)
WBC # SPEC AUTO: 6.72 X10(3)/MCL (ref 4.5–11.5)

## 2024-04-19 PROCEDURE — 85610 PROTHROMBIN TIME: CPT | Performed by: INTERNAL MEDICINE

## 2024-04-19 PROCEDURE — 87389 HIV-1 AG W/HIV-1&-2 AB AG IA: CPT | Performed by: INTERNAL MEDICINE

## 2024-04-19 PROCEDURE — 85025 COMPLETE CBC W/AUTO DIFF WBC: CPT | Performed by: INTERNAL MEDICINE

## 2024-04-19 PROCEDURE — 25000003 PHARM REV CODE 250: Performed by: INTERNAL MEDICINE

## 2024-04-19 PROCEDURE — 80053 COMPREHEN METABOLIC PANEL: CPT | Performed by: INTERNAL MEDICINE

## 2024-04-19 PROCEDURE — 63600175 PHARM REV CODE 636 W HCPCS: Mod: JZ,JG | Performed by: INTERNAL MEDICINE

## 2024-04-19 PROCEDURE — 86140 C-REACTIVE PROTEIN: CPT | Performed by: INTERNAL MEDICINE

## 2024-04-19 PROCEDURE — 85384 FIBRINOGEN ACTIVITY: CPT | Performed by: INTERNAL MEDICINE

## 2024-04-19 PROCEDURE — 83521 IG LIGHT CHAINS FREE EACH: CPT | Performed by: INTERNAL MEDICINE

## 2024-04-19 PROCEDURE — 21400001 HC TELEMETRY ROOM

## 2024-04-19 PROCEDURE — 82784 ASSAY IGA/IGD/IGG/IGM EACH: CPT | Performed by: INTERNAL MEDICINE

## 2024-04-19 PROCEDURE — 85730 THROMBOPLASTIN TIME PARTIAL: CPT | Performed by: INTERNAL MEDICINE

## 2024-04-19 PROCEDURE — 25500020 PHARM REV CODE 255: Performed by: INTERNAL MEDICINE

## 2024-04-19 PROCEDURE — 99223 1ST HOSP IP/OBS HIGH 75: CPT | Mod: ,,, | Performed by: INTERNAL MEDICINE

## 2024-04-19 PROCEDURE — 84100 ASSAY OF PHOSPHORUS: CPT | Performed by: INTERNAL MEDICINE

## 2024-04-19 PROCEDURE — 84165 PROTEIN E-PHORESIS SERUM: CPT | Performed by: INTERNAL MEDICINE

## 2024-04-19 PROCEDURE — 63600175 PHARM REV CODE 636 W HCPCS: Performed by: INTERNAL MEDICINE

## 2024-04-19 PROCEDURE — 86039 ANTINUCLEAR ANTIBODIES (ANA): CPT | Performed by: INTERNAL MEDICINE

## 2024-04-19 PROCEDURE — 85652 RBC SED RATE AUTOMATED: CPT | Performed by: INTERNAL MEDICINE

## 2024-04-19 PROCEDURE — 80074 ACUTE HEPATITIS PANEL: CPT | Performed by: INTERNAL MEDICINE

## 2024-04-19 PROCEDURE — 25000242 PHARM REV CODE 250 ALT 637 W/ HCPCS: Performed by: INTERNAL MEDICINE

## 2024-04-19 PROCEDURE — 83735 ASSAY OF MAGNESIUM: CPT | Performed by: INTERNAL MEDICINE

## 2024-04-19 RX ORDER — CETIRIZINE HYDROCHLORIDE 10 MG/1
10 TABLET ORAL DAILY
Status: DISCONTINUED | OUTPATIENT
Start: 2024-04-19 | End: 2024-04-21 | Stop reason: HOSPADM

## 2024-04-19 RX ORDER — FLUTICASONE PROPIONATE 50 MCG
2 SPRAY, SUSPENSION (ML) NASAL DAILY
Status: DISCONTINUED | OUTPATIENT
Start: 2024-04-19 | End: 2024-04-21 | Stop reason: HOSPADM

## 2024-04-19 RX ORDER — SODIUM CHLORIDE, SODIUM LACTATE, POTASSIUM CHLORIDE, CALCIUM CHLORIDE 600; 310; 30; 20 MG/100ML; MG/100ML; MG/100ML; MG/100ML
INJECTION, SOLUTION INTRAVENOUS CONTINUOUS
Status: ACTIVE | OUTPATIENT
Start: 2024-04-19 | End: 2024-04-20

## 2024-04-19 RX ORDER — DIPHENHYDRAMINE HCL 25 MG
25 CAPSULE ORAL ONCE
Status: COMPLETED | OUTPATIENT
Start: 2024-04-19 | End: 2024-04-19

## 2024-04-19 RX ORDER — ACETAMINOPHEN 325 MG/1
650 TABLET ORAL ONCE
Status: COMPLETED | OUTPATIENT
Start: 2024-04-19 | End: 2024-04-19

## 2024-04-19 RX ADMIN — ACETAMINOPHEN 650 MG: 325 TABLET, FILM COATED ORAL at 10:04

## 2024-04-19 RX ADMIN — FLUTICASONE PROPIONATE 100 MCG: 50 SPRAY, METERED NASAL at 10:04

## 2024-04-19 RX ADMIN — PANTOPRAZOLE SODIUM 40 MG: 40 TABLET, DELAYED RELEASE ORAL at 08:04

## 2024-04-19 RX ADMIN — DEXAMETHASONE SODIUM PHOSPHATE 40 MG: 10 INJECTION INTRAMUSCULAR; INTRAVENOUS at 08:04

## 2024-04-19 RX ADMIN — FOLIC ACID 1 MG: 1 TABLET ORAL at 08:04

## 2024-04-19 RX ADMIN — SODIUM CHLORIDE, POTASSIUM CHLORIDE, SODIUM LACTATE AND CALCIUM CHLORIDE: 600; 310; 30; 20 INJECTION, SOLUTION INTRAVENOUS at 11:04

## 2024-04-19 RX ADMIN — DIPHENHYDRAMINE HYDROCHLORIDE 25 MG: 25 CAPSULE ORAL at 10:04

## 2024-04-19 RX ADMIN — HUMAN IMMUNOGLOBULIN G 80 G: 20 LIQUID INTRAVENOUS at 11:04

## 2024-04-19 RX ADMIN — SODIUM CHLORIDE, POTASSIUM CHLORIDE, SODIUM LACTATE AND CALCIUM CHLORIDE: 600; 310; 30; 20 INJECTION, SOLUTION INTRAVENOUS at 07:04

## 2024-04-19 RX ADMIN — LOSARTAN POTASSIUM 50 MG: 50 TABLET, FILM COATED ORAL at 08:04

## 2024-04-19 RX ADMIN — CETIRIZINE HYDROCHLORIDE 10 MG: 10 TABLET, FILM COATED ORAL at 08:04

## 2024-04-19 RX ADMIN — IOHEXOL 100 ML: 350 INJECTION, SOLUTION INTRAVENOUS at 12:04

## 2024-04-19 RX ADMIN — THERA TABS 1 TABLET: TAB at 08:04

## 2024-04-19 NOTE — PROGRESS NOTES
[facility]  Hospital Medicine Progress Note        Chief Complaint: Inpatient Follow-up for Abnormal labs/thrombocytopenia    HPI: This is a 48-year-old male with no significant past medical history present to the ED after outpatient labs show thrombocytopenia.  Patient report he was diagnosed with influenza at the end of January 2024 and since then had intermittent nonproductive cough he visited PCP/urgent care on 04/08/2024 and chest x-ray shows small patchy focus in the left lung base could be infiltrate or atelectasis,  prescribed azithromycin, albuterol oral inhaler, on 04/10/2024 report while in the shower after coughing episode he started to feel lightheaded and occipital headache and felt he was about to pass out prompted him to present to the ED repeat chest x-ray show no abnormalities, he was noted to be hypertensive with systolic 170s, COVID 19 and flu negative, labs notable for WBC 2.51 K, platelets 56 K and hemoglobin 15.7 he was discharged home with recommendation to follow up with his PCP     He followed up with his PCP today 04/18/2024 and repeat CBC show WBC 4.0 2K and platelets 10 K, for which she was instructed to present to the ED.      Report occipital headache, denies hemoptysis, hematemesis, melena or hematochezia or hematuria, did not notice any bruises or skin lesion, denies any gum bleeding.  He also denies night sweats or weight loss.      On arrival to ED he was afebrile, hypertensive.  Repeat labs notable for WBC 4.34 K with normal differential, hemoglobin 15.1, platelets 7 K, retics 2.02%, INR 1.0, PTT 31.3, total bilirubin normal, total protein 8.1, globulin 4.0, albumin 4.1, normal folate and B12, normal fibrinogen, LDH and haptoglobin.  Peripheral smear pending.  Chest x-ray show no airspace disease.       Hematology consulted, started on dexamethasone and referred to hospital medicine service for further evaluation and management.    Interval Hx:   No  acute overnight events reported by the staff. S/p platelet transfusion 1 unit. Continued on Decadron.  Platelets improving today  Hematology evaluated the patient, initiated IVIG    Patient was seen and examined at bedside, no major concerning complaints. Chart was reviewed, afebrile hemodynamically stable most recent labs were reviewed.  Platelets 7> 18. Creatinine 1.2      Objective/physical exam:  General: In no acute distress, afebrile  Chest: Clear to auscultation bilaterally  Heart:  +S1, S2, no appreciable murmur  Abdomen: Soft, nontender, BS +  MSK: Warm, no lower extremity edema, no clubbing or cyanosis  Skin:  With petechia over chest, back, lower extremities  Neurologic: Alert and oriented , no focal deficit    VITAL SIGNS: 24 HRS MIN & MAX LAST   Temp  Min: 97.4 °F (36.3 °C)  Max: 98.7 °F (37.1 °C) 97.4 °F (36.3 °C)   BP  Min: 125/86  Max: 163/95 129/86   Pulse  Min: 78  Max: 103  97   Resp  Min: 11  Max: 21 20   SpO2  Min: 95 %  Max: 98 % 95 %     I have reviewed the following labs:  Recent Labs   Lab 04/18/24  1118 04/18/24  1519 04/19/24  0335   WBC 4.02* 4.34* 6.72   RBC 5.17 5.12 5.56   HGB 15.1 15.1 16.4   HCT 44.0 43.6 47.4   MCV 85.1 85.2 85.3   MCH 29.2 29.5 29.5   MCHC 34.3 34.6 34.6   RDW 12.3 12.3 12.3   PLT 10* 7* 18*   MPV 14.4*  --  12.8*     Recent Labs   Lab 04/18/24  1519 04/19/24  0335    138   K 3.9 3.6   CO2 21* 20*   BUN 13.4 12.0   CREATININE 1.01 1.20*   CALCIUM 9.7 10.5*   MG  --  2.20   ALBUMIN 4.1 4.5   ALKPHOS 72 76   ALT 27 30   AST 20 19   BILITOT 0.5 0.7     Microbiology Results (last 7 days)       ** No results found for the last 168 hours. **             See below for Radiology    Scheduled Med:  Current Facility-Administered Medications   Medication Dose Route Frequency    acetaminophen  650 mg Oral Once    cetirizine  10 mg Oral Daily    dexAMETHasone (DECADRON) 40 mg in sodium chloride 0.9% 50 mL IVPB  40 mg Intravenous Q24H    diphenhydrAMINE  25 mg Oral Once     fluticasone propionate  2 spray Each Nostril Daily    folic acid  1 mg Oral Daily    Immune Globulin G (IGG)-PRO-IGA 10 % injection (Privigen)  80 g Intravenous Q24H    losartan  50 mg Oral Daily    multivitamin  1 tablet Oral Daily    pantoprazole  40 mg Oral Daily      Continuous Infusions:  Current Facility-Administered Medications   Medication Dose Route Frequency Last Rate Last Admin    lactated ringers   Intravenous Continuous 75 mL/hr at 04/19/24 0755 New Bag at 04/19/24 0755      PRN Meds:    Current Facility-Administered Medications:     0.9%  NaCl infusion (for blood administration), , Intravenous, Q24H PRN    acetaminophen, 650 mg, Oral, Q4H PRN    aluminum-magnesium hydroxide-simethicone, 30 mL, Oral, QID PRN    cloNIDine, 0.1 mg, Oral, TID PRN    hydrALAZINE, 10 mg, Intravenous, Q4H PRN    labetalol, 10 mg, Intravenous, Q4H PRN    melatonin, 6 mg, Oral, Nightly PRN    ondansetron, 4 mg, Intravenous, Q4H PRN    polyethylene glycol, 17 g, Oral, BID PRN    prochlorperazine, 5 mg, Intravenous, Q6H PRN    senna-docusate 8.6-50 mg, 2 tablet, Oral, BID PRN    sodium chloride 0.9%, 10 mL, Intravenous, PRN     Assessment/Plan:  Acute idiopathic thrombocytopenic purpura  Headache  Persistent nonproductive cough suspect postnasal drip  ARMANDO, mild    Plan   Status post 1 unit of platelet transfusion  On IV Decadron , day 2 today.  Hematology was consulted, appreciate recommendations, initiated on IVIG, recommending for 2 days, day 1 today  Platelets improving, continue to monitor cbc daily  Follow up on CT chest abdomen pelvis with contrast  Analgesics and antitussives p.r.n.  Monitor creatinine, urine output, avoid nephrotoxins, continue IV fluids.    Critical care Time Spent>35 min  Acute thrombocytic purpura requiring transfusions , IVIG , IV steroids        VTE prophylaxis:  SCDs, not a candidate for chemical prophylaxis      Anticipated discharge and Disposition:   To be decided      All diagnosis and  differential diagnosis have been reviewed; assessment and plan has been documented; I have personally reviewed the labs and test results that are presently available; I have reviewed the patients medication list; I have reviewed the consulting providers response and recommendations. I have reviewed or attempted to review medical records based upon their availability    All of the patient's questions have been  addressed and answered. Patient's is agreeable to the above stated plan. I will continue to monitor closely and make adjustments to medical management as needed.  _____________________________________________________________________    Nutrition Status:    Radiology:  I have personally reviewed the following imaging and agree with the radiologist.     CT Head Without Contrast  Narrative: EXAMINATION:  CT HEAD WITHOUT CONTRAST    CLINICAL HISTORY:  r/o bleed -- headache and thrombocytopenia;    TECHNIQUE:  Low dose axial images were obtained through the head.  Coronal and sagittal reformations were also performed. Contrast was not administered.    Automatic exposure control was utilized to reduce the patient's radiation dose.    DLP= 934    COMPARISON:  None.    FINDINGS:  No acute intracranial hemorrhage, edema or mass. No acute parenchymal abnormality.    There is no hydrocephalus, evidence of herniation or midline shift. The ventricles and sulci are normal.    There is normal gray white differentiation.    The osseous structures are normal.    The mastoid air cells are clear.    The auditory canals are patent bilaterally.    The globes and orbital contents are normal bilaterally.    Fluid layering within the left maxillary sinus.  Impression: No acute intracranial abnormality identified.    Electronically signed by: Rubio Quijano  Date:    04/19/2024  Time:    06:06      Ashlee Mathur MD  Department of Hospital Medicine   [facility]   04/19/2024

## 2024-04-19 NOTE — PLAN OF CARE
Problem: Adult Inpatient Plan of Care  Goal: Plan of Care Review  Flowsheets (Taken 4/18/2024 2115)  Plan of Care Reviewed With:   patient   spouse  Goal: Absence of Hospital-Acquired Illness or Injury  Intervention: Identify and Manage Fall Risk  Flowsheets (Taken 4/18/2024 2115)  Safety Promotion/Fall Prevention:   assistive device/personal item within reach   side rails raised x 2  Intervention: Prevent Skin Injury  Flowsheets (Taken 4/18/2024 2115)  Body Position: position changed independently  Skin Protection:   tubing/devices free from skin contact   protective footwear used  Intervention: Prevent and Manage VTE (Venous Thromboembolism) Risk  Flowsheets (Taken 4/18/2024 2115)  Activity Management: Ambulated -L4  Range of Motion: active ROM (range of motion) encouraged  Intervention: Prevent Infection  Flowsheets (Taken 4/18/2024 2115)  Infection Prevention:   rest/sleep promoted   single patient room provided  Goal: Optimal Comfort and Wellbeing  Intervention: Monitor Pain and Promote Comfort  Flowsheets (Taken 4/18/2024 2115)  Pain Management Interventions:   care clustered   pain management plan reviewed with patient/caregiver  Intervention: Provide Person-Centered Care  Flowsheets (Taken 4/18/2024 2115)  Trust Relationship/Rapport:   questions answered   questions encouraged

## 2024-04-19 NOTE — PLAN OF CARE
Problem: Adult Inpatient Plan of Care  Goal: Plan of Care Review  4/19/2024 0516 by Asher Duffy RN  Outcome: Ongoing, Progressing  4/18/2024 2115 by Asher Duffy RN  Flowsheets (Taken 4/18/2024 2115)  Plan of Care Reviewed With:   patient   spouse  Goal: Patient-Specific Goal (Individualized)  Outcome: Ongoing, Progressing  Goal: Absence of Hospital-Acquired Illness or Injury  Outcome: Ongoing, Progressing  Intervention: Identify and Manage Fall Risk  Flowsheets (Taken 4/18/2024 2115)  Safety Promotion/Fall Prevention:   assistive device/personal item within reach   side rails raised x 2  Intervention: Prevent Skin Injury  Flowsheets (Taken 4/18/2024 2115)  Body Position: position changed independently  Skin Protection:   tubing/devices free from skin contact   protective footwear used  Intervention: Prevent and Manage VTE (Venous Thromboembolism) Risk  Flowsheets (Taken 4/18/2024 2115)  Activity Management: Ambulated -L4  Range of Motion: active ROM (range of motion) encouraged  Intervention: Prevent Infection  Flowsheets (Taken 4/18/2024 2115)  Infection Prevention:   rest/sleep promoted   single patient room provided  Goal: Optimal Comfort and Wellbeing  Outcome: Ongoing, Progressing  Intervention: Monitor Pain and Promote Comfort  Flowsheets (Taken 4/18/2024 2115)  Pain Management Interventions:   care clustered   pain management plan reviewed with patient/caregiver  Intervention: Provide Person-Centered Care  Flowsheets (Taken 4/18/2024 2115)  Trust Relationship/Rapport:   questions answered   questions encouraged  Goal: Readiness for Transition of Care  Outcome: Ongoing, Progressing

## 2024-04-19 NOTE — CONSULTS
[facility][1] General - Oncology Acute  Hematology/Oncology  Consult Note    Patient Name: Darian Morales  MRN: [id number]  Admission Date: 4/18/2024  Hospital Length of Stay: 1 days  Attending Provider: Ashlee Mathur MD  Consulting Provider: Sheila Kuhn MD  Principal Problem:Acute idiopathic thrombocytopenic purpura    Inpatient consult to Hematology  Consult performed by: Sheila Kuhn MD  Consult ordered by: Major Burks MD        Subjective:     HPI: 49yo male with no significant PMH present to the ED after outpatient labs with PCP showed thrombocytopenia with platelets of 10K. Patient reports being diagnosed with the flu in January, and since then had intermittent nonproductive cough. He was seen at PCPs office on 04/08/2024 and chest x-ray showed patchy focus in the left lung base could be infiltrate or atelectasis, He was prescribed azithromycin, albuterol oral inhaler. On 04/10/2024 while in the shower after coughing episode he started to feel lightheaded, had a near syncopal episode, and presented to ED. Repeat chest x-ray show no abnormalities, he was noted to be hypertensive with systolic 170s, COVID 19 and flu negative. Labs at that time notable for WBC 2.51 K, platelets 56 K and hemoglobin 15.7 he was discharged home with recommendation to follow up with his PCP. On follow-up with his PCP on 4/18/24, labs were notable for severe thrombocytopenia with platelets 10K and he was instructed to present to the ED.      Report occipital headache, denies hemoptysis, hematemesis, melena or hematochezia or hematuria. He did have a few bruises, no gum bleeding.  He also denies night sweats or weight loss.      On arrival to ED he was afebrile, hypertensive.  Repeat labs notable for WBC 4.34 with mild neutropenia ANC  hemoglobin 15.1, platelets 7 K, retics 2.02%, INR 1.0, PTT 31.3, total bilirubin normal, total protein 8.1, globulin 4.0, albumin 4.1, normal folate and B12, normal fibrinogen,  LDH and haptoglobin.  Peripheral smear pending.  Chest x-ray show no airspace disease.  CT brain with no acute findings.   CT C/A/P done, results pending. Hematology consulted and patient started on IV Decadron 40mg with plan for 4 days. He was given platelet transfusion.    Patient has no complaints this morning. Sitting up in bed. Wife at bedside. No bleeding or further bruising. Platelets up to 18K.     Medications:  Continuous Infusions:  Current Facility-Administered Medications   Medication Dose Route Frequency Last Rate Last Admin    lactated ringers   Intravenous Continuous 75 mL/hr at 04/19/24 0755 New Bag at 04/19/24 0755     Scheduled Meds:  Current Facility-Administered Medications   Medication Dose Route Frequency    acetaminophen  650 mg Oral Once    cetirizine  10 mg Oral Daily    dexAMETHasone (DECADRON) 40 mg in sodium chloride 0.9% 50 mL IVPB  40 mg Intravenous Q24H    diphenhydrAMINE  25 mg Oral Once    fluticasone propionate  2 spray Each Nostril Daily    folic acid  1 mg Oral Daily    Immune Globulin G (IGG)-PRO-IGA 10 % injection (Privigen)  1 g/kg Intravenous Q24H    losartan  50 mg Oral Daily    multivitamin  1 tablet Oral Daily    pantoprazole  40 mg Oral Daily     PRN Meds:  Current Facility-Administered Medications:     0.9%  NaCl infusion (for blood administration), , Intravenous, Q24H PRN    acetaminophen, 650 mg, Oral, Q4H PRN    aluminum-magnesium hydroxide-simethicone, 30 mL, Oral, QID PRN    cloNIDine, 0.1 mg, Oral, TID PRN    hydrALAZINE, 10 mg, Intravenous, Q4H PRN    labetalol, 10 mg, Intravenous, Q4H PRN    melatonin, 6 mg, Oral, Nightly PRN    ondansetron, 4 mg, Intravenous, Q4H PRN    polyethylene glycol, 17 g, Oral, BID PRN    prochlorperazine, 5 mg, Intravenous, Q6H PRN    senna-docusate 8.6-50 mg, 2 tablet, Oral, BID PRN    sodium chloride 0.9%, 10 mL, Intravenous, PRN     Review of patient's allergies indicates:  No Known Allergies     No past medical history on file.  No  past surgical history on file.  Family History    None       Tobacco Use    Smoking status: Never    Smokeless tobacco: Never   Substance and Sexual Activity    Alcohol use: Yes     Comment: occasionally    Drug use: Never    Sexual activity: Not on file       Review of Systems   All other systems reviewed and are negative.    Objective:     Vital Signs (Most Recent):  Temp: 97.4 °F (36.3 °C) (04/19/24 0709)  Pulse: 97 (04/19/24 0709)  Resp: 20 (04/19/24 0709)  BP: 129/86 (04/19/24 0839)  SpO2: 95 % (04/19/24 0709) Vital Signs (24h Range):  Temp:  [97.4 °F (36.3 °C)-98.7 °F (37.1 °C)] 97.4 °F (36.3 °C)  Pulse:  [] 97  Resp:  [11-21] 20  SpO2:  [95 %-98 %] 95 %  BP: (125-163)/() 129/86     Weight: 92.5 kg (204 lb)  Body mass index is 31.95 kg/m².  Body surface area is 2.09 meters squared.      Intake/Output Summary (Last 24 hours) at 4/19/2024 0904  Last data filed at 4/18/2024 2331  Gross per 24 hour   Intake 552.5 ml   Output --   Net 552.5 ml       Physical Exam  Constitutional:       General: He is awake.      Appearance: Normal appearance.   HENT:      Head: Normocephalic and atraumatic.      Nose: Nose normal.      Mouth/Throat:      Mouth: Mucous membranes are moist.   Eyes:      General: Vision grossly intact.      Extraocular Movements: Extraocular movements intact.      Conjunctiva/sclera: Conjunctivae normal.   Cardiovascular:      Rate and Rhythm: Normal rate and regular rhythm.      Heart sounds: Normal heart sounds.   Pulmonary:      Effort: Pulmonary effort is normal.      Breath sounds: Normal breath sounds.   Abdominal:      General: Bowel sounds are normal. There is no distension.      Palpations: Abdomen is soft.      Tenderness: There is no abdominal tenderness.   Musculoskeletal:      Cervical back: Normal range of motion and neck supple.      Right lower leg: No edema.      Left lower leg: No edema.   Lymphadenopathy:      Cervical: No cervical adenopathy.      Upper Body:      Right  "upper body: No supraclavicular adenopathy.      Left upper body: No supraclavicular adenopathy.   Skin:     General: Skin is warm.      Comments: Very few scattered bruises   Neurological:      Mental Status: He is alert and oriented to person, place, and time.      Motor: Motor function is intact.   Psychiatric:         Mood and Affect: Mood normal.         Speech: Speech normal.         Behavior: Behavior is cooperative.         Judgment: Judgment normal.         Significant Labs:   BMP:   Recent Labs   Lab 04/18/24  1519 04/19/24  0335    138   K 3.9 3.6   CO2 21* 20*   BUN 13.4 12.0   CREATININE 1.01 1.20*   CALCIUM 9.7 10.5*   MG  --  2.20   , CBC:   Recent Labs   Lab 04/18/24  1118 04/18/24  1519 04/19/24  0335   WBC 4.02* 4.34* 6.72   HGB 15.1 15.1 16.4   HCT 44.0 43.6 47.4   PLT 10* 7* 18*   , CMP:   Recent Labs   Lab 04/18/24  1519 04/19/24  0335    138   K 3.9 3.6   CO2 21* 20*   BUN 13.4 12.0   CREATININE 1.01 1.20*   CALCIUM 9.7 10.5*   ALBUMIN 4.1 4.5   BILITOT 0.5 0.7   ALKPHOS 72 76   AST 20 19   ALT 27 30   , Coagulation:   Recent Labs   Lab 04/18/24  1749 04/19/24  0335   INR 1.0 1.0   , Haptoglobin: No results for input(s): "HAPTOGLOBIN" in the last 48 hours., and Immunology: No results for input(s): "SPEP", "JEN", "GAYLE", "FREELAMBDALI" in the last 48 hours.    Diagnostic Results:  I have reviewed all pertinent imaging results/findings within the past 24 hours.    Assessment/Plan:     Active Hospital Problems    Diagnosis    *Acute idiopathic thrombocytopenic purpura       Plan:  Patient with isolated thrombocytopenia. Other work-up negative.  Most likely diagnosis of ITP.   Peripheral smear review pending and CT C/A/P pending.     Started on IV Decadron 40mg X 4 days. Day 2 today.  Will add IVIG 1g/kg X 2 days, day 1 today.  S/p platelet transfusion 1 unit.    Platelets improved today. CBC otherwise remains normal.  Continue current treatment.  Daily labs.    If platelets continue to " improve to >30-50, can likely be discharged on Sunday after his last dose of IV Decadron.  Will arrange close follow-up in my clinic.       Thank you for your consult. I will follow-up with patient. Please contact us if you have any additional questions.      Dr. Mello on call for weekend.     Sheila Kuhn MD  Hematology/Oncology  [facility] - Oncology [facility]

## 2024-04-19 NOTE — PLAN OF CARE
04/19/24 1008   Discharge Assessment   Assessment Type Discharge Planning Assessment   Confirmed/corrected address, phone number and insurance Yes   Source of Information patient   When was your last doctors appointment? 04/18/24   Communicated SETH with patient/caregiver Date not available/Unable to determine   Reason For Admission thrombocytopenia   People in Home child(jewels), dependent;spouse   Do you expect to return to your current living situation? Yes   Do you have help at home or someone to help you manage your care at home? Yes   Who are your caregiver(s) and their phone number(s)? wife: Ly   Prior to hospitilization cognitive status: Alert/Oriented   Current cognitive status: Alert/Oriented   Walking or Climbing Stairs Difficulty no   Dressing/Bathing Difficulty no   Home Accessibility stairs to enter home;other (see comments)  (ramp available)   Number of Stairs, Main Entrance three   Stair Railings, Main Entrance railing on right side (ascending)   Home Layout Able to live on 1st floor   Equipment Currently Used at Home none   Readmission within 30 days? No   Patient currently being followed by outpatient case management? No   Do you currently have service(s) that help you manage your care at home? No   Do you take prescription medications? No   Who is going to help you get home at discharge? wife   How do you get to doctors appointments? car, drives self   Are you on dialysis? No   Do you take coumadin? No   Discharge Plan A Home   Discharge Plan B Home   DME Needed Upon Discharge  none   Discharge Plan discussed with: Patient;Spouse/sig other   Name(s) and Number(s) Ly (wife)   Transition of Care Barriers None   Financial Resource Strain   How hard is it for you to pay for the very basics like food, housing, medical care, and heating? Not very   Housing Stability   In the last 12 months, was there a time when you were not able to pay the mortgage or rent on time? N   At any time in the past  12 months, were you homeless or living in a shelter (including now)? N   Transportation Needs   In the past 12 months, has lack of transportation kept you from medical appointments or from getting medications? no   In the past 12 months, has lack of transportation kept you from meetings, work, or from getting things needed for daily living? No   Food Insecurity   Within the past 12 months, you worried that your food would run out before you got the money to buy more. Never true   Within the past 12 months, the food you bought just didn't last and you didn't have money to get more. Never true   Social Connections   In a typical week, how many times do you talk on the phone with family, friends, or neighbors? More than 3   How often do you get together with friends or relatives? More than 3   Are you , , , , never , or living with a partner?    Alcohol Use   Q1: How often do you have a drink containing alcohol? 2-4 pr month   Q2: How many drinks containing alcohol do you have on a typical day when you are drinking? 1 or 2   Q3: How often do you have six or more drinks on one occasion? Never   OTHER   Name(s) of People in Home Ly (wife)     Pharmacy: [facility] in [place]  Pt denies any dc needs at this time.

## 2024-04-19 NOTE — H&P
[facility] Hospital Medicine - H&P Note    Patient Name: Darian Morales  MRN: [id number]  PCP: Leona Burton MD  Admitting Physician: sEperanza Rodríguez MD  Admission Class: IP- Inpatient   Date of Service: 04/18/2024  Code status: Full    Chief Complaint   Abnormal labs/thrombocytopenia    History of Present Illness   This is a 48-year-old male with no significant past medical history present to the ED after outpatient labs show thrombocytopenia.  Patient report he was diagnosed with influenza at the end of January 2024 and since then had intermittent nonproductive cough he visited PCP/urgent care on 04/08/2024 and chest x-ray shows small patchy focus in the left lung base could be infiltrate or atelectasis,  prescribed azithromycin, albuterol oral inhaler, on 04/10/2024 report while in the shower after coughing episode he started to feel lightheaded and occipital headache and felt he was about to pass out prompted him to present to the ED repeat chest x-ray show no abnormalities, he was noted to be hypertensive with systolic 170s, COVID 19 and flu negative, labs notable for WBC 2.51 K, platelets 56 K and hemoglobin 15.7 he was discharged home with recommendation to follow up with his PCP    He followed up with his PCP today 04/18/2024 and repeat CBC show WBC 4.0 2K and platelets 10 K, for which she was instructed to present to the ED.     Report occipital headache, denies hemoptysis, hematemesis, melena or hematochezia or hematuria, did not notice any bruises or skin lesion, denies any gum bleeding.  He also denies night sweats or weight loss.     On arrival to ED he was afebrile, hypertensive.  Repeat labs notable for WBC 4.34 K with normal differential, hemoglobin 15.1, platelets 7 K, retics 2.02%, INR 1.0, PTT 31.3, total bilirubin normal, total protein 8.1, globulin 4.0, albumin 4.1, normal folate and B12, normal fibrinogen, LDH and haptoglobin.  Peripheral smear pending.   Chest x-ray show no airspace disease.      Hematology consulted, started on dexamethasone and referred to hospital medicine service for further evaluation and management.    ROS   Except as documented, all other systems reviewed and negative     Past Medical History   Hypertension    Past Surgical History   Denies prior surgeries    Social History     Social History     Tobacco Use    Smoking status: Never    Smokeless tobacco: Never   Substance Use Topics    Alcohol use: Yes     Comment: occasionally        Family History   Denies family history of blood malignancies or disorders.    Allergies   Patient has no known allergies.    Home Medications   Not on any prescribed medication aside from recently prescribed azithromycin and albuterol    Physical Exam   Vital Signs  Temp:  [97.4 °F (36.3 °C)-98 °F (36.7 °C)]   Pulse:  [78-92]   Resp:  [11-21]   BP: (139-159)/()   SpO2:  [96 %-98 %]    General: Appears comfortable  HEENT: NC/AT  Neck:  No JVD, no cervical or supraclavicular adenopathy palpated.  Chest: CTABL  CVS: Regular rhythm. Normal S1/S2.  Abdomen: nondistended, normoactive BS, soft and non-tender no significant hepatosplenomegaly appreciated  MSK: No obvious deformity or joint swelling  Skin:  Scattered petechiae over chest, upper back, bilateral lower extremities  Neuro: AAOx3, no focal neurological deficit  Psych: Cooperative    Labs     Recent Labs     04/18/24  1118 04/18/24  1519   WBC 4.02* 4.34*   RBC 5.17 5.12   HGB 15.1 15.1   HCT 44.0 43.6   MCV 85.1 85.2   MCH 29.2 29.5   MCHC 34.3 34.6   RDW 12.3 12.3   PLT 10* 7*     Recent Labs     04/18/24  1519 04/18/24  1749   PROTIME  --  13.0   INR  --  1.0   PTT  --  31.3   [id number]  --    FOLATE  --  9.2     --    RETICCNTAUTO 2.02  --    RETABS 0.1032*  --       Recent Labs     04/18/24  1519      K 3.9   CHLORIDE 106   CO2 21*   BUN 13.4   CREATININE 1.01   EGFRNORACEVR >60   GLUCOSE 82   CALCIUM 9.7   ALBUMIN 4.1   GLOBULIN  4.0*   ALKPHOS 72   ALT 27   AST 20   BILITOT 0.5          Microbiology Results (last 7 days)       ** No results found for the last 168 hours. **           Imaging     CT Chest Abdomen Pelvis With IV Contrast (XPD) Routine Oral Contrast    (Results Pending)   CT Head Without Contrast    (Results Pending)     X-Ray Chest PA And Lateral  Narrative: EXAMINATION:  XR CHEST PA AND LATERAL    CLINICAL HISTORY:  Pneumonia.    TECHNIQUE:  2 views of the chest    COMPARISON:  04/10/2024    FINDINGS:  The lungs demonstrate no evidence of lobar type consolidation, visible pneumothorax, or pleural fluid.   The cardiac silhouette is within normal limits for size.   No acute displaced fracture is seen.  Impression: 1. No evidence lobar type consolidation or acute cardiac decompensation noted.    Electronically signed by: Dann Cortez MD  Date:    04/18/2024  Time:    11:52    Assessment   Acute isolated thrombocytopenia  Suspect Acute ITP probably triggered by recent viral URI  Hypertension - untreated   Headache  Persistent nonproductive cough suspect postnasal drip      Plan   Thrombocytopenic precautions  Peripheral smear- in lab/pending  Platelets antibody profile-pending  Check HIV, hepatitis panel, GAYLE by IFA, ESR, CRP, SPEP/JEN  Given headache will obtain CT head without contrast to rule out bleed  CT chest abdomen pelvis with contrast to evaluate for lymphadenopathy and hepatosplenomegaly.  Dexamethasone 40 mg IV daily for 4 doses  Although no active bleeding but given platelets less than 10,000 will transfuse 1 unit single donor platelets.  Folic acid/multivitamin daily  Hematology consulted  Start losartan 50 mg daily  PRN antihypertensives for B P> 160/90  Start cetirizine/Flonase for nonproductive cough due to probable postnasal drip  GI prophylaxis PPI p.o. daily  VTE prophylaxis SCDs     Critical care time:  35 minutes  Critical care diagnosis:  Critical acute thrombocytopenia/platelets less than 10,000    Ali M  MD [facility]  Internal Medicine

## 2024-04-20 LAB
ALBUMIN SERPL-MCNC: 3.4 G/DL (ref 3.5–5)
ALBUMIN/GLOB SERPL: 0.7 RATIO (ref 1.1–2)
ALP SERPL-CCNC: 61 UNIT/L (ref 40–150)
ALT SERPL-CCNC: 21 UNIT/L (ref 0–55)
AST SERPL-CCNC: 14 UNIT/L (ref 5–34)
BASOPHILS # BLD AUTO: 0.01 X10(3)/MCL
BASOPHILS NFR BLD AUTO: 0.1 %
BILIRUB SERPL-MCNC: 0.4 MG/DL
BUN SERPL-MCNC: 18 MG/DL (ref 8.9–20.6)
CALCIUM SERPL-MCNC: 9.2 MG/DL (ref 8.4–10.2)
CHLORIDE SERPL-SCNC: 106 MMOL/L (ref 98–107)
CO2 SERPL-SCNC: 20 MMOL/L (ref 22–29)
CREAT SERPL-MCNC: 0.96 MG/DL (ref 0.73–1.18)
EOSINOPHIL # BLD AUTO: 0 X10(3)/MCL (ref 0–0.9)
EOSINOPHIL NFR BLD AUTO: 0 %
ERYTHROCYTE [DISTWIDTH] IN BLOOD BY AUTOMATED COUNT: 12.8 % (ref 11.5–17)
GFR SERPLBLD CREATININE-BSD FMLA CKD-EPI: >60 MLS/MIN/1.73/M2
GLOBULIN SER-MCNC: 4.8 GM/DL (ref 2.4–3.5)
GLUCOSE SERPL-MCNC: 147 MG/DL (ref 74–100)
HCT VFR BLD AUTO: 38.3 % (ref 42–52)
HGB BLD-MCNC: 13.4 G/DL (ref 14–18)
IMM GRANULOCYTES # BLD AUTO: 0.05 X10(3)/MCL (ref 0–0.04)
IMM GRANULOCYTES NFR BLD AUTO: 0.5 %
LYMPHOCYTES # BLD AUTO: 0.88 X10(3)/MCL (ref 0.6–4.6)
LYMPHOCYTES NFR BLD AUTO: 9.2 %
MCH RBC QN AUTO: 29.3 PG (ref 27–31)
MCHC RBC AUTO-ENTMCNC: 35 G/DL (ref 33–36)
MCV RBC AUTO: 83.8 FL (ref 80–94)
MONOCYTES # BLD AUTO: 0.95 X10(3)/MCL (ref 0.1–1.3)
MONOCYTES NFR BLD AUTO: 10 %
NEUTROPHILS # BLD AUTO: 7.64 X10(3)/MCL (ref 2.1–9.2)
NEUTROPHILS NFR BLD AUTO: 80.2 %
NRBC BLD AUTO-RTO: 0 %
PA IGG BLD QL FC: ABNORMAL
PA IGM BLD QL FC: NEGATIVE
PLATELET # BLD AUTO: 62 X10(3)/MCL (ref 130–400)
PLATELETS.RETICULATED NFR BLD AUTO: 9.4 % (ref 0.9–11.2)
PMV BLD AUTO: 11.7 FL (ref 7.4–10.4)
POTASSIUM SERPL-SCNC: 3.9 MMOL/L (ref 3.5–5.1)
PROT SERPL-MCNC: 8.2 GM/DL (ref 6.4–8.3)
RBC # BLD AUTO: 4.57 X10(6)/MCL (ref 4.7–6.1)
SODIUM SERPL-SCNC: 135 MMOL/L (ref 136–145)
WBC # SPEC AUTO: 9.53 X10(3)/MCL (ref 4.5–11.5)

## 2024-04-20 PROCEDURE — 85025 COMPLETE CBC W/AUTO DIFF WBC: CPT | Performed by: INTERNAL MEDICINE

## 2024-04-20 PROCEDURE — 80053 COMPREHEN METABOLIC PANEL: CPT | Performed by: INTERNAL MEDICINE

## 2024-04-20 PROCEDURE — 25000003 PHARM REV CODE 250: Performed by: INTERNAL MEDICINE

## 2024-04-20 PROCEDURE — 63600175 PHARM REV CODE 636 W HCPCS: Mod: JZ,JG | Performed by: INTERNAL MEDICINE

## 2024-04-20 PROCEDURE — 21400001 HC TELEMETRY ROOM

## 2024-04-20 PROCEDURE — 63600175 PHARM REV CODE 636 W HCPCS: Performed by: INTERNAL MEDICINE

## 2024-04-20 PROCEDURE — 99232 SBSQ HOSP IP/OBS MODERATE 35: CPT | Mod: ,,, | Performed by: INTERNAL MEDICINE

## 2024-04-20 RX ADMIN — DEXAMETHASONE SODIUM PHOSPHATE 40 MG: 10 INJECTION INTRAMUSCULAR; INTRAVENOUS at 08:04

## 2024-04-20 RX ADMIN — HUMAN IMMUNOGLOBULIN G 80 G: 20 LIQUID INTRAVENOUS at 10:04

## 2024-04-20 RX ADMIN — THERA TABS 1 TABLET: TAB at 08:04

## 2024-04-20 RX ADMIN — CETIRIZINE HYDROCHLORIDE 10 MG: 10 TABLET, FILM COATED ORAL at 08:04

## 2024-04-20 RX ADMIN — LOSARTAN POTASSIUM 50 MG: 50 TABLET, FILM COATED ORAL at 08:04

## 2024-04-20 RX ADMIN — FLUTICASONE PROPIONATE 100 MCG: 50 SPRAY, METERED NASAL at 08:04

## 2024-04-20 RX ADMIN — PANTOPRAZOLE SODIUM 40 MG: 40 TABLET, DELAYED RELEASE ORAL at 08:04

## 2024-04-20 RX ADMIN — FOLIC ACID 1 MG: 1 TABLET ORAL at 08:04

## 2024-04-20 NOTE — PROGRESS NOTES
[facility] - [facility] Acute  Hematology/Oncology  Consult Note    Patient Name: Darian Morales  MRN: [id number]  Admission Date: 4/18/2024  Hospital Length of Stay: 2 days  Attending Provider: Sujit Prince MD  Consulting Provider: Ladi Jett MD  Principal Problem:Acute idiopathic thrombocytopenic purpura    Consults  Subjective:     HPI: 49yo male with no significant PMH present to the ED after outpatient labs with PCP showed thrombocytopenia with platelets of 10K. Patient reports being diagnosed with the flu in January, and since then had intermittent nonproductive cough. He was seen at PCPs office on 04/08/2024 and chest x-ray showed patchy focus in the left lung base could be infiltrate or atelectasis, He was prescribed azithromycin, albuterol oral inhaler. On 04/10/2024 while in the shower after coughing episode he started to feel lightheaded, had a near syncopal episode, and presented to ED. Repeat chest x-ray show no abnormalities, he was noted to be hypertensive with systolic 170s, COVID 19 and flu negative. Labs at that time notable for WBC 2.51 K, platelets 56 K and hemoglobin 15.7 he was discharged home with recommendation to follow up with his PCP. On follow-up with his PCP on 4/18/24, labs were notable for severe thrombocytopenia with platelets 10K and he was instructed to present to the ED.      Report occipital headache, denies hemoptysis, hematemesis, melena or hematochezia or hematuria. He did have a few bruises, no gum bleeding.  He also denies night sweats or weight loss.      On arrival to ED he was afebrile, hypertensive.  Repeat labs notable for WBC 4.34 with mild neutropenia ANC  hemoglobin 15.1, platelets 7 K, retics 2.02%, INR 1.0, PTT 31.3, total bilirubin normal, total protein 8.1, globulin 4.0, albumin 4.1, normal folate and B12, normal fibrinogen, LDH and haptoglobin.  Peripheral smear pending.  Chest x-ray show no airspace disease.  CT brain with  no acute findings.   CT C/A/P done, results pending. Hematology consulted and patient started on IV Decadron 40mg with plan for 4 days. He was given platelet transfusion.    Patient has no complaints this morning. Sitting up in bed. Wife at bedside. No bleeding or further bruising. Platelets up to 18K.     04/20/2024: Patient is seen in rounds this morning.  Wife at bedside.  He is lying in bed and voices no concerns.  Platelets today 62,000.  Today's day 3 of dexamethasone and day 2 of IVIG.  No bleeding.  CT head essentially negative.  CT chest abdomen pelvis Nonspecific lymphadenopathy seen in the axillary regions bilaterally. Mild hepatomegaly and some hepatic steatosis. Otherwise unremarkable    Medications:  Continuous Infusions:  Current Facility-Administered Medications   Medication Dose Route Frequency Last Rate Last Admin     Scheduled Meds:  Current Facility-Administered Medications   Medication Dose Route Frequency    cetirizine  10 mg Oral Daily    dexAMETHasone (DECADRON) 40 mg in sodium chloride 0.9% 50 mL IVPB  40 mg Intravenous Q24H    fluticasone propionate  2 spray Each Nostril Daily    folic acid  1 mg Oral Daily    Immune Globulin G (IGG)-PRO-IGA 10 % injection (Privigen)  80 g Intravenous Q24H    losartan  50 mg Oral Daily    multivitamin  1 tablet Oral Daily    pantoprazole  40 mg Oral Daily     PRN Meds:  Current Facility-Administered Medications:     acetaminophen, 650 mg, Oral, Q4H PRN    aluminum-magnesium hydroxide-simethicone, 30 mL, Oral, QID PRN    cloNIDine, 0.1 mg, Oral, TID PRN    hydrALAZINE, 10 mg, Intravenous, Q4H PRN    labetalol, 10 mg, Intravenous, Q4H PRN    melatonin, 6 mg, Oral, Nightly PRN    ondansetron, 4 mg, Intravenous, Q4H PRN    polyethylene glycol, 17 g, Oral, BID PRN    prochlorperazine, 5 mg, Intravenous, Q6H PRN    senna-docusate 8.6-50 mg, 2 tablet, Oral, BID PRN    sodium chloride 0.9%, 10 mL, Intravenous, PRN     Review of patient's allergies indicates:  No  Known Allergies     No past medical history on file.  No past surgical history on file.  Family History    None       Tobacco Use    Smoking status: Never    Smokeless tobacco: Never   Substance and Sexual Activity    Alcohol use: Yes     Comment: occasionally    Drug use: Never    Sexual activity: Not on file       Review of Systems   Constitutional:  Negative for activity change, appetite change, chills, fatigue, fever and unexpected weight change.   HENT:  Negative for nosebleeds, sore throat and trouble swallowing.    Eyes: Negative.    Respiratory:  Negative for cough and shortness of breath.    Cardiovascular:  Negative for chest pain and palpitations.   Gastrointestinal:  Negative for abdominal pain, constipation, diarrhea, nausea and vomiting.   Endocrine: Negative.    Genitourinary:  Negative for difficulty urinating, dysuria, frequency and hematuria.   Musculoskeletal:  Negative for arthralgias, back pain and neck pain.   Skin:  Negative for rash and wound.   Allergic/Immunologic: Negative.    Neurological:  Negative for dizziness, tremors, seizures, syncope, weakness, light-headedness and headaches.   Hematological:  Negative for adenopathy. Does not bruise/bleed easily.   Psychiatric/Behavioral:  Negative for confusion and suicidal ideas.    All other systems reviewed and are negative.    Objective:     Vital Signs (Most Recent):  Temp: 97.7 °F (36.5 °C) (04/20/24 0755)  Pulse: 70 (04/20/24 0755)  Resp: 18 (04/20/24 0309)  BP: 124/80 (04/20/24 0836)  SpO2: 97 % (04/20/24 0755) Vital Signs (24h Range):  Temp:  [97.6 °F (36.4 °C)-98.5 °F (36.9 °C)] 97.7 °F (36.5 °C)  Pulse:  [] 70  Resp:  [18] 18  SpO2:  [95 %-98 %] 97 %  BP: (124-151)/(77-84) 124/80     Weight: 92.5 kg (204 lb)  Body mass index is 31.95 kg/m².  Body surface area is 2.09 meters squared.      Intake/Output Summary (Last 24 hours) at 4/20/2024 0923  Last data filed at 4/19/2024 2122  Gross per 24 hour   Intake 1680 ml   Output --   Net  1680 ml       Physical Exam  Constitutional:       General: He is awake.      Appearance: Normal appearance.   HENT:      Head: Normocephalic and atraumatic.      Nose: Nose normal.      Mouth/Throat:      Mouth: Mucous membranes are moist.   Eyes:      General: Vision grossly intact.      Extraocular Movements: Extraocular movements intact.      Conjunctiva/sclera: Conjunctivae normal.   Cardiovascular:      Rate and Rhythm: Normal rate and regular rhythm.      Heart sounds: Normal heart sounds.   Pulmonary:      Effort: Pulmonary effort is normal.      Breath sounds: Normal breath sounds.   Abdominal:      General: Bowel sounds are normal. There is no distension.      Palpations: Abdomen is soft.      Tenderness: There is no abdominal tenderness.   Musculoskeletal:      Cervical back: Normal range of motion and neck supple.      Right lower leg: No edema.      Left lower leg: No edema.   Lymphadenopathy:      Cervical: No cervical adenopathy.      Upper Body:      Right upper body: No supraclavicular adenopathy.      Left upper body: No supraclavicular adenopathy.   Skin:     General: Skin is warm.      Comments: Very few scattered bruises   Neurological:      Mental Status: He is alert and oriented to person, place, and time.      Motor: Motor function is intact.   Psychiatric:         Mood and Affect: Mood normal.         Speech: Speech normal.         Behavior: Behavior is cooperative.         Judgment: Judgment normal.         Significant Labs:   BMP:   Recent Labs   Lab 04/18/24 1519 04/19/24  0335 04/20/24 0347    138 135*   K 3.9 3.6 3.9   CO2 21* 20* 20*   BUN 13.4 12.0 18.0   CREATININE 1.01 1.20* 0.96   CALCIUM 9.7 10.5* 9.2   MG  --  2.20  --    , CBC:   Recent Labs   Lab 04/18/24 1519 04/19/24  0335 04/20/24 0347   WBC 4.34* 6.72 9.53   HGB 15.1 16.4 13.4*   HCT 43.6 47.4 38.3*   PLT 7* 18* 62*   , CMP:   Recent Labs   Lab 04/18/24 1519 04/19/24  0335 04/20/24  0347    138 135*   K 3.9  "3.6 3.9   CO2 21* 20* 20*   BUN 13.4 12.0 18.0   CREATININE 1.01 1.20* 0.96   CALCIUM 9.7 10.5* 9.2   ALBUMIN 4.1 3.9  4.5 3.4*   BILITOT 0.5 0.7 0.4   ALKPHOS 72 76 61   AST 20 19 14   ALT 27 30 21   , Coagulation:   Recent Labs   Lab 04/18/24  1749 04/19/24  0335   INR 1.0 1.0   , Haptoglobin: No results for input(s): "HAPTOGLOBIN" in the last 48 hours., and Immunology: No results for input(s): "SPEP", "JEN", "GAYLE", "FREELAMBDALI" in the last 48 hours.    Diagnostic Results:  I have reviewed all pertinent imaging results/findings within the past 24 hours.    Assessment/Plan:     Active Hospital Problems    Diagnosis    *Acute idiopathic thrombocytopenic purpura       Plan:  Patient with isolated thrombocytopenia. Other work-up negative.  Most likely diagnosis of ITP.   Peripheral smear review - Severe thrombocytopenia with occasional large and giant forms; no schistocytes or platelet clumps identified.   CT C/A/P no splenomegaly     Started on IV Decadron 40mg X 4 days. Day 3 today.  Will add IVIG 1g/kg X 2 days, day 2 today.  S/p platelet transfusion 1 unit on 4/18/2024    Platelets improved today. CBC otherwise remains normal.  Continue current treatment.  Daily labs.    If platelets continue to improve to >30-50, can likely be discharged on Sunday after his last dose of IV Decadron.  Will arrange close follow-up with Dr Bam Jett MD  Hematology/Oncology  [facility]    "

## 2024-04-20 NOTE — PLAN OF CARE
Problem: Adult Inpatient Plan of Care  Goal: Plan of Care Review  Outcome: Ongoing, Progressing  Flowsheets (Taken 4/19/2024 2258)  Plan of Care Reviewed With: patient  Goal: Patient-Specific Goal (Individualized)  Outcome: Ongoing, Progressing  Goal: Absence of Hospital-Acquired Illness or Injury  Outcome: Ongoing, Progressing  Intervention: Identify and Manage Fall Risk  Flowsheets (Taken 4/19/2024 2258)  Safety Promotion/Fall Prevention:   assistive device/personal item within reach   nonskid shoes/socks when out of bed   side rails raised x 2  Intervention: Prevent Skin Injury  Flowsheets (Taken 4/19/2024 2258)  Body Position: position changed independently  Skin Protection:   protective footwear used   tubing/devices free from skin contact  Intervention: Prevent and Manage VTE (Venous Thromboembolism) Risk  Flowsheets (Taken 4/19/2024 2258)  Activity Management: Ambulated to bathroom - L4  VTE Prevention/Management:   ROM (active) performed   intravenous hydration  Range of Motion:   active ROM (range of motion) encouraged   ROM (range of motion) performed  Intervention: Prevent Infection  Flowsheets (Taken 4/19/2024 2258)  Infection Prevention:   rest/sleep promoted   single patient room provided  Goal: Optimal Comfort and Wellbeing  Outcome: Ongoing, Progressing  Intervention: Monitor Pain and Promote Comfort  Flowsheets (Taken 4/19/2024 2258)  Pain Management Interventions:   care clustered   quiet environment facilitated  Intervention: Provide Person-Centered Care  Flowsheets (Taken 4/19/2024 2258)  Trust Relationship/Rapport:   questions answered   questions encouraged  Goal: Readiness for Transition of Care  Outcome: Ongoing, Progressing

## 2024-04-20 NOTE — PROGRESS NOTES
[facility]  Hospital Medicine Progress Note        Chief Complaint: Inpatient Follow-up for ITP    HPI:   This is a 48-year-old male with no significant past medical history present to the ED after outpatient labs show thrombocytopenia.  Patient report he was diagnosed with influenza at the end of January 2024 and since then had intermittent nonproductive cough he visited PCP/urgent care on 04/08/2024 and chest x-ray shows small patchy focus in the left lung base could be infiltrate or atelectasis,  prescribed azithromycin, albuterol oral inhaler, on 04/10/2024 report while in the shower after coughing episode he started to feel lightheaded and occipital headache and felt he was about to pass out prompted him to present to the ED repeat chest x-ray show no abnormalities, he was noted to be hypertensive with systolic 170s, COVID 19 and flu negative, labs notable for WBC 2.51 K, platelets 56 K and hemoglobin 15.7 he was discharged home with recommendation to follow up with his PCP     He followed up with his PCP today 04/18/2024 and repeat CBC show WBC 4.0 2K and platelets 10 K, for which she was instructed to present to the ED.      Report occipital headache, denies hemoptysis, hematemesis, melena or hematochezia or hematuria, did not notice any bruises or skin lesion, denies any gum bleeding.  He also denies night sweats or weight loss.      On arrival to ED he was afebrile, hypertensive.  Repeat labs notable for WBC 4.34 K with normal differential, hemoglobin 15.1, platelets 7 K, retics 2.02%, INR 1.0, PTT 31.3, total bilirubin normal, total protein 8.1, globulin 4.0, albumin 4.1, normal folate and B12, normal fibrinogen, LDH and haptoglobin.  Peripheral smear pending.  Chest x-ray show no airspace disease.       Hematology consulted, started on dexamethasone and referred to hospital medicine service for further evaluation and management. Patient diagnosed with ITP and started on  steroids and IGG. Platelets improved.     Peripheral smear showed:    - Severe thrombocytopenia with occasional large and giant forms; no schistocytes or platelet clumps identified.    Impression: Thrombocytopenia can be due to decreased production, increased destruction (immune and non-immune mediated) or due to splenic sequestration.       Interval Hx:   Patient today awake and comfortable. Denies any fever, chills, headache or SOB. Ambulating well.No new rash noticed.     Family at bedside, explained in detail about the patients condition, diagnosis, vitals, labs and treatment plan. They understand and agree with the plan. All their questions were answered.      Case was discussed with patient's nurse and  on the floor.    Objective/physical exam:  General: In no acute distress, afebrile  Chest: Clear to auscultation bilaterally  Heart: RRR, +S1, S2, no appreciable murmur  Abdomen: Soft, nontender, BS +  MSK: Warm, no lower extremity edema, no clubbing or cyanosis  Neurologic: Alert and oriented x4, Cranial nerve II-XII intact, Strength 5/5 in all 4 extremities    VITAL SIGNS: 24 HRS MIN & MAX LAST   Temp  Min: 97.6 °F (36.4 °C)  Max: 98.5 °F (36.9 °C) 97.7 °F (36.5 °C)   BP  Min: 124/80  Max: 151/77 124/80   Pulse  Min: 68  Max: 100  70   Resp  Min: 18  Max: 18 18   SpO2  Min: 95 %  Max: 98 % 97 %     I have reviewed the following labs:  Recent Labs   Lab 04/18/24  1118 04/18/24 1519 04/19/24  0335 04/20/24  0347   WBC 4.02* 4.34* 6.72 9.53   RBC 5.17 5.12 5.56 4.57*   HGB 15.1 15.1 16.4 13.4*   HCT 44.0 43.6 47.4 38.3*   MCV 85.1 85.2 85.3 83.8   MCH 29.2 29.5 29.5 29.3   MCHC 34.3 34.6 34.6 35.0   RDW 12.3 12.3 12.3 12.8   PLT 10* 7* 18* 62*   MPV 14.4*  --  12.8* 11.7*     Recent Labs   Lab 04/18/24  1519 04/19/24  0335 04/20/24  0347    138 135*   K 3.9 3.6 3.9   CO2 21* 20* 20*   BUN 13.4 12.0 18.0   CREATININE 1.01 1.20* 0.96   CALCIUM 9.7 10.5* 9.2   MG  --  2.20  --    ALBUMIN 4.1 3.9   4.5 3.4*   ALKPHOS 72 76 61   ALT 27 30 21   AST 20 19 14   BILITOT 0.5 0.7 0.4     Microbiology Results (last 7 days)       ** No results found for the last 168 hours. **             See below for Radiology    Scheduled Med:  Current Facility-Administered Medications   Medication Dose Route Frequency    cetirizine  10 mg Oral Daily    dexAMETHasone (DECADRON) 40 mg in sodium chloride 0.9% 50 mL IVPB  40 mg Intravenous Q24H    fluticasone propionate  2 spray Each Nostril Daily    folic acid  1 mg Oral Daily    Immune Globulin G (IGG)-PRO-IGA 10 % injection (Privigen)  80 g Intravenous Q24H    losartan  50 mg Oral Daily    multivitamin  1 tablet Oral Daily    pantoprazole  40 mg Oral Daily      Continuous Infusions:  Current Facility-Administered Medications   Medication Dose Route Frequency Last Rate Last Admin      PRN Meds:    Current Facility-Administered Medications:     acetaminophen, 650 mg, Oral, Q4H PRN    aluminum-magnesium hydroxide-simethicone, 30 mL, Oral, QID PRN    cloNIDine, 0.1 mg, Oral, TID PRN    hydrALAZINE, 10 mg, Intravenous, Q4H PRN    labetalol, 10 mg, Intravenous, Q4H PRN    melatonin, 6 mg, Oral, Nightly PRN    ondansetron, 4 mg, Intravenous, Q4H PRN    polyethylene glycol, 17 g, Oral, BID PRN    prochlorperazine, 5 mg, Intravenous, Q6H PRN    senna-docusate 8.6-50 mg, 2 tablet, Oral, BID PRN    sodium chloride 0.9%, 10 mL, Intravenous, PRN     Assessment/Plan:  Acute idiopathic thrombocytopenic purpura: improved   Headache: resolved   Persistent nonproductive cough suspect postnasal drip: resolved   ARMANDO, mild: resolved       Plan:  Patient looks comfortable. Has been afebrile. No rash or lesions on the body   Will continue steroids and IV IGG per hematologist   Reviewed today's labs and  WBC 9. Hb 13.4, Plt 62, , K 3.9, Crt 0.96    Continue supportive care       VTE prophylaxis: Patient ambulating well     Patient condition:  Fair    Anticipated discharge and Disposition:   Dc in am  if stable       All diagnosis and differential diagnosis have been reviewed; assessment and plan has been documented; I have personally reviewed the labs and test results that are presently available; I have reviewed the patients medication list; I have reviewed the consulting providers response and recommendations. I have reviewed or attempted to review medical records based upon their availability    All of the patient's questions have been  addressed and answered. Patient's is agreeable to the above stated plan. I will continue to monitor closely and make adjustments to medical management as needed.  _____________________________________________________________________    Nutrition Status:    Radiology:  I have personally reviewed the following imaging and agree with the radiologist.     CT Chest Abdomen Pelvis With IV Contrast (XPD) NO Oral Contrast  Narrative: EXAMINATION:  CT CHEST ABDOMEN PELVIS WITH IV CONTRAST (XPD)    CLINICAL HISTORY:  Assess of lymphadenopathy and hepatosplenomegaly;    TECHNIQUE:  Low dose axial images, sagittal and coronal reformations were obtained from the thoracic inlet to the pubic symphysis following the IV and oral contrast administration.  Automatic exposure control is utilized to reduce patient radiation exposure.    FINDINGS:  The lungs are adequately aerated.  No mass is seen.  No nodule is seen.  No pleural thickening is seen.  No pleural effusion is seen.  No infiltrate is seen.    The thoracic aorta is normal in caliber..    No mediastinal adenopathy is seen.  Some nonspecific axillary lymphadenopathy is seen.  It is seen bilaterally.  The largest lymph node is seen in the right axilla that measures 4.3 x 2 cm and has a fatty hilum.    The heart appears normal in size..    There is a small hiatal hernia    There is evidence of hepatic steatosis.  There is mild hepatomegaly..  No liver mass or lesion is seen.  Portal and hepatic veins appear normal..    The gallbladder  appears normal.  No gallstones are seen.    The spleen appears normal.  No splenic mass or lesion is seen.  No splenomegaly is seen.    The pancreas appears grossly unremarkable.  No pancreatic mass or lesion is seen.  No inflammation is seen.    No adrenal abnormality is seen.  No adrenal nodule is seen.    The kidneys are well perfused.  No hydronephrosis is seen.  No hydroureter is seen.  No retroperitoneal abnormality is seen..    Visualized portions of the bowel shows no acute abnormality.  No colitis is seen.  No diverticulitis is seen.  No colonic mass is seen.    No free air is seen.  No free fluid is seen.    No mesenteric or retroperitoneal lymphadenopathy is seen.    Urinary bladder appears unremarkable.    No acute bony abnormality is seen.  Impression: Nonspecific lymphadenopathy seen in the axillary regions bilaterally    Mild hepatomegaly and some hepatic steatosis    Otherwise unremarkable    Electronically signed by: Tang Davies  Date:    04/19/2024  Time:    12:45  CT Head Without Contrast  Narrative: EXAMINATION:  CT HEAD WITHOUT CONTRAST    CLINICAL HISTORY:  r/o bleed -- headache and thrombocytopenia;    TECHNIQUE:  Low dose axial images were obtained through the head.  Coronal and sagittal reformations were also performed. Contrast was not administered.    Automatic exposure control was utilized to reduce the patient's radiation dose.    DLP= 934    COMPARISON:  None.    FINDINGS:  No acute intracranial hemorrhage, edema or mass. No acute parenchymal abnormality.    There is no hydrocephalus, evidence of herniation or midline shift. The ventricles and sulci are normal.    There is normal gray white differentiation.    The osseous structures are normal.    The mastoid air cells are clear.    The auditory canals are patent bilaterally.    The globes and orbital contents are normal bilaterally.    Fluid layering within the left maxillary sinus.  Impression: No acute intracranial abnormality  identified.    Electronically signed by: Rubio Quijano  Date:    04/19/2024  Time:    06:06      Sujit Prince MD  Department of Hospital Medicine   [facility]   04/20/2024

## 2024-04-21 VITALS
RESPIRATION RATE: 20 BRPM | DIASTOLIC BLOOD PRESSURE: 93 MMHG | HEIGHT: 67 IN | HEART RATE: 62 BPM | OXYGEN SATURATION: 98 % | SYSTOLIC BLOOD PRESSURE: 152 MMHG | BODY MASS INDEX: 32.02 KG/M2 | WEIGHT: 204 LBS | TEMPERATURE: 98 F

## 2024-04-21 LAB
BASOPHILS # BLD AUTO: 0.01 X10(3)/MCL
BASOPHILS NFR BLD AUTO: 0.1 %
EOSINOPHIL # BLD AUTO: 0 X10(3)/MCL (ref 0–0.9)
EOSINOPHIL NFR BLD AUTO: 0 %
ERYTHROCYTE [DISTWIDTH] IN BLOOD BY AUTOMATED COUNT: 12.7 % (ref 11.5–17)
HCT VFR BLD AUTO: 37.5 % (ref 42–52)
HGB BLD-MCNC: 13 G/DL (ref 14–18)
IMM GRANULOCYTES # BLD AUTO: 0.05 X10(3)/MCL (ref 0–0.04)
IMM GRANULOCYTES NFR BLD AUTO: 0.6 %
LYMPHOCYTES # BLD AUTO: 1.1 X10(3)/MCL (ref 0.6–4.6)
LYMPHOCYTES NFR BLD AUTO: 12.5 %
MCH RBC QN AUTO: 29.7 PG (ref 27–31)
MCHC RBC AUTO-ENTMCNC: 34.7 G/DL (ref 33–36)
MCV RBC AUTO: 85.6 FL (ref 80–94)
MONOCYTES # BLD AUTO: 0.89 X10(3)/MCL (ref 0.1–1.3)
MONOCYTES NFR BLD AUTO: 10.1 %
NEUTROPHILS # BLD AUTO: 6.73 X10(3)/MCL (ref 2.1–9.2)
NEUTROPHILS NFR BLD AUTO: 76.7 %
NRBC BLD AUTO-RTO: 0 %
PATH REV: NORMAL
PLATELET # BLD AUTO: 105 X10(3)/MCL (ref 130–400)
PLATELETS.RETICULATED NFR BLD AUTO: 5.1 % (ref 0.9–11.2)
PMV BLD AUTO: 10.3 FL (ref 7.4–10.4)
RBC # BLD AUTO: 4.38 X10(6)/MCL (ref 4.7–6.1)
WBC # SPEC AUTO: 8.78 X10(3)/MCL (ref 4.5–11.5)

## 2024-04-21 PROCEDURE — 85025 COMPLETE CBC W/AUTO DIFF WBC: CPT | Performed by: INTERNAL MEDICINE

## 2024-04-21 PROCEDURE — 63600175 PHARM REV CODE 636 W HCPCS: Performed by: INTERNAL MEDICINE

## 2024-04-21 PROCEDURE — 25000003 PHARM REV CODE 250: Performed by: INTERNAL MEDICINE

## 2024-04-21 RX ADMIN — FLUTICASONE PROPIONATE 100 MCG: 50 SPRAY, METERED NASAL at 10:04

## 2024-04-21 RX ADMIN — FOLIC ACID 1 MG: 1 TABLET ORAL at 09:04

## 2024-04-21 RX ADMIN — CETIRIZINE HYDROCHLORIDE 10 MG: 10 TABLET, FILM COATED ORAL at 10:04

## 2024-04-21 RX ADMIN — LOSARTAN POTASSIUM 50 MG: 50 TABLET, FILM COATED ORAL at 10:04

## 2024-04-21 RX ADMIN — PANTOPRAZOLE SODIUM 40 MG: 40 TABLET, DELAYED RELEASE ORAL at 09:04

## 2024-04-21 RX ADMIN — THERA TABS 1 TABLET: TAB at 10:04

## 2024-04-21 RX ADMIN — DEXAMETHASONE SODIUM PHOSPHATE 40 MG: 10 INJECTION INTRAMUSCULAR; INTRAVENOUS at 10:04

## 2024-04-21 NOTE — DISCHARGE INSTRUCTIONS
Please return to the ER with any worsening of your symptoms. Call your PCP with any further questions or concerns. Follow up with Oncology and primary care!

## 2024-04-21 NOTE — PLAN OF CARE
Problem: Adult Inpatient Plan of Care  Goal: Plan of Care Review  Outcome: Ongoing, Progressing  Flowsheets (Taken 4/20/2024 1938)  Plan of Care Reviewed With: patient  Goal: Patient-Specific Goal (Individualized)  Outcome: Ongoing, Progressing  Goal: Absence of Hospital-Acquired Illness or Injury  Outcome: Ongoing, Progressing  Intervention: Identify and Manage Fall Risk  Flowsheets (Taken 4/20/2024 1938)  Safety Promotion/Fall Prevention:   assistive device/personal item within reach   side rails raised x 2   nonskid shoes/socks when out of bed  Intervention: Prevent Skin Injury  Flowsheets (Taken 4/20/2024 1938)  Body Position: position changed independently  Skin Protection:   protective footwear used   tubing/devices free from skin contact  Intervention: Prevent and Manage VTE (Venous Thromboembolism) Risk  Flowsheets (Taken 4/20/2024 1938)  Activity Management: Ambulated in room - L4  Range of Motion:   active ROM (range of motion) encouraged   ROM (range of motion) performed  Intervention: Prevent Infection  Flowsheets (Taken 4/20/2024 1938)  Infection Prevention:   rest/sleep promoted   single patient room provided  Goal: Optimal Comfort and Wellbeing  Outcome: Ongoing, Progressing  Intervention: Monitor Pain and Promote Comfort  Flowsheets (Taken 4/20/2024 1938)  Pain Management Interventions:   care clustered   quiet environment facilitated  Intervention: Provide Person-Centered Care  Flowsheets (Taken 4/20/2024 1938)  Trust Relationship/Rapport:   questions answered   questions encouraged  Goal: Readiness for Transition of Care  Outcome: Ongoing, Progressing

## 2024-04-21 NOTE — DISCHARGE SUMMARY
[facility] Hospital Medicine Discharge Summary    Admit Date: 4/18/2024  Discharge Date and Time: 4/21/20248:42 AM  Admitting Physician:  Team  Discharging Physician: Sujit Prince MD.  Primary Care Physician: Leona Burton MD  Consults: Hematology/Oncology    Discharge Diagnoses:  Acute idiopathic thrombocytopenic purpura: improved   Headache: resolved   Persistent nonproductive cough suspect postnasal drip: resolved   ARMANDO, mild: resolved     Hospital Course:   This is a 48-year-old male with no significant past medical history present to the ED after outpatient labs show thrombocytopenia.  Patient report he was diagnosed with influenza at the end of January 2024 and since then had intermittent nonproductive cough he visited PCP/urgent care on 04/08/2024 and chest x-ray shows small patchy focus in the left lung base could be infiltrate or atelectasis,  prescribed azithromycin, albuterol oral inhaler, on 04/10/2024 report while in the shower after coughing episode he started to feel lightheaded and occipital headache and felt he was about to pass out prompted him to present to the ED repeat chest x-ray show no abnormalities, he was noted to be hypertensive with systolic 170s, COVID 19 and flu negative, labs notable for WBC 2.51 K, platelets 56 K and hemoglobin 15.7 he was discharged home with recommendation to follow up with his PCP     He followed up with his PCP today 04/18/2024 and repeat CBC show WBC 4.0 2K and platelets 10 K, for which she was instructed to present to the ED.      Report occipital headache, denies hemoptysis, hematemesis, melena or hematochezia or hematuria, did not notice any bruises or skin lesion, denies any gum bleeding.  He also denies night sweats or weight loss.      On arrival to ED he was afebrile, hypertensive.  Repeat labs notable for WBC 4.34 K with normal differential, hemoglobin 15.1, platelets 7 K, retics 2.02%, INR 1.0, PTT 31.3,  total bilirubin normal, total protein 8.1, globulin 4.0, albumin 4.1, normal folate and B12, normal fibrinogen, LDH and haptoglobin.  Peripheral smear pending.  Chest x-ray show no airspace disease.       Hematology consulted, started on dexamethasone and referred to hospital medicine service for further evaluation and management. Patient diagnosed with ITP and started on steroids and IGG. Platelets improved and was >100K. Patient was stable and asymptomatic. Completed 3 days of iv steroids. Discharged home in a stable condition.     Pt was seen and examined on the day of discharge  Vitals:  VITAL SIGNS: 24 HRS MIN & MAX LAST   Temp  Min: 97.4 °F (36.3 °C)  Max: 98.2 °F (36.8 °C) 97.8 °F (36.6 °C)   BP  Min: 124/73  Max: 152/93 (!) 152/93   Pulse  Min: 52  Max: 82  (!) 52   Resp  Min: 18  Max: 20 18   SpO2  Min: 94 %  Max: 98 % 98 %       Physical Exam:  Heart RRR  Lungs clear   Abdomen soft and non tender   Neuro: No FND      Procedures Performed: No admission procedures for hospital encounter.     Significant Diagnostic Studies: See Full reports for all details    Recent Labs   Lab 04/19/24  0335 04/20/24  0347 04/21/24  0502   WBC 6.72 9.53 8.78   RBC 5.56 4.57* 4.38*   HGB 16.4 13.4* 13.0*   HCT 47.4 38.3* 37.5*   MCV 85.3 83.8 85.6   MCH 29.5 29.3 29.7   MCHC 34.6 35.0 34.7   RDW 12.3 12.8 12.7   PLT 18* 62* 105*   MPV 12.8* 11.7* 10.3       Recent Labs   Lab 04/18/24  1519 04/19/24  0335 04/20/24  0347    138 135*   K 3.9 3.6 3.9   CO2 21* 20* 20*   BUN 13.4 12.0 18.0   CREATININE 1.01 1.20* 0.96   CALCIUM 9.7 10.5* 9.2   MG  --  2.20  --    ALBUMIN 4.1 3.9  4.5 3.4*   ALKPHOS 72 76 61   ALT 27 30 21   AST 20 19 14   BILITOT 0.5 0.7 0.4        Microbiology Results (last 7 days)       ** No results found for the last 168 hours. **             CT Chest Abdomen Pelvis With IV Contrast (XPD) NO Oral Contrast  Narrative: EXAMINATION:  CT CHEST ABDOMEN PELVIS WITH IV CONTRAST (XPD)    CLINICAL  HISTORY:  Assess of lymphadenopathy and hepatosplenomegaly;    TECHNIQUE:  Low dose axial images, sagittal and coronal reformations were obtained from the thoracic inlet to the pubic symphysis following the IV and oral contrast administration.  Automatic exposure control is utilized to reduce patient radiation exposure.    FINDINGS:  The lungs are adequately aerated.  No mass is seen.  No nodule is seen.  No pleural thickening is seen.  No pleural effusion is seen.  No infiltrate is seen.    The thoracic aorta is normal in caliber..    No mediastinal adenopathy is seen.  Some nonspecific axillary lymphadenopathy is seen.  It is seen bilaterally.  The largest lymph node is seen in the right axilla that measures 4.3 x 2 cm and has a fatty hilum.    The heart appears normal in size..    There is a small hiatal hernia    There is evidence of hepatic steatosis.  There is mild hepatomegaly..  No liver mass or lesion is seen.  Portal and hepatic veins appear normal..    The gallbladder appears normal.  No gallstones are seen.    The spleen appears normal.  No splenic mass or lesion is seen.  No splenomegaly is seen.    The pancreas appears grossly unremarkable.  No pancreatic mass or lesion is seen.  No inflammation is seen.    No adrenal abnormality is seen.  No adrenal nodule is seen.    The kidneys are well perfused.  No hydronephrosis is seen.  No hydroureter is seen.  No retroperitoneal abnormality is seen..    Visualized portions of the bowel shows no acute abnormality.  No colitis is seen.  No diverticulitis is seen.  No colonic mass is seen.    No free air is seen.  No free fluid is seen.    No mesenteric or retroperitoneal lymphadenopathy is seen.    Urinary bladder appears unremarkable.    No acute bony abnormality is seen.  Impression: Nonspecific lymphadenopathy seen in the axillary regions bilaterally    Mild hepatomegaly and some hepatic steatosis    Otherwise unremarkable    Electronically signed by: Tang  Sachasin  Date:    04/19/2024  Time:    12:45  CT Head Without Contrast  Narrative: EXAMINATION:  CT HEAD WITHOUT CONTRAST    CLINICAL HISTORY:  r/o bleed -- headache and thrombocytopenia;    TECHNIQUE:  Low dose axial images were obtained through the head.  Coronal and sagittal reformations were also performed. Contrast was not administered.    Automatic exposure control was utilized to reduce the patient's radiation dose.    DLP= 934    COMPARISON:  None.    FINDINGS:  No acute intracranial hemorrhage, edema or mass. No acute parenchymal abnormality.    There is no hydrocephalus, evidence of herniation or midline shift. The ventricles and sulci are normal.    There is normal gray white differentiation.    The osseous structures are normal.    The mastoid air cells are clear.    The auditory canals are patent bilaterally.    The globes and orbital contents are normal bilaterally.    Fluid layering within the left maxillary sinus.  Impression: No acute intracranial abnormality identified.    Electronically signed by: Rubio Quijano  Date:    04/19/2024  Time:    06:06         Medication List      You have not been prescribed any medications.          Explained in detail to the patient about the discharge plan, medications, and follow-up visits. Pt understands and agrees with the treatment plan  Discharge Disposition: Home or Self Care   Discharged Condition: stable  Diet-   Dietary Orders (From admission, onward)       Start     Ordered    04/18/24 1938  Diet Adult Regular  (Diet/Nutrition [facility])  Diet effective now         04/18/24 1937                   Medications Per DC med rec  Activities as tolerated   Follow-up Information       Sheila Kuhn MD Follow up.    Specialties: Oncology, Hematology and Oncology  Why: As scheduled  Contact information:  [address]  [address]  [facility] [address]  [phone]                           For further questions contact hospitalist office    Discharge time 33  minutes    For worsening symptoms, chest pain, shortness of breath, increased abdominal pain, high grade fever, stroke or stroke like symptoms, immediately go to the nearest Emergency Room or call 911 as soon as possible.      Sujit Gonzalez M.D, on 4/21/2024. at 8:42 AM.

## 2024-04-22 ENCOUNTER — TELEPHONE (OUTPATIENT)
Dept: HEMATOLOGY/ONCOLOGY | Facility: CLINIC | Age: 49
End: 2024-04-22
Payer: COMMERCIAL

## 2024-04-22 DIAGNOSIS — D69.3 ACUTE IDIOPATHIC THROMBOCYTOPENIC PURPURA: Primary | ICD-10-CM

## 2024-04-22 LAB
ANA SER QL HEP2 SUBST: NORMAL
KAPPA LC FREE SER-MCNC: 1.64 MG/DL (ref 0.33–1.94)
KAPPA LC FREE/LAMBDA FREE SER: 0.94 {RATIO} (ref 0.26–1.65)
LAMBDA LC FREE SERPL-MCNC: 1.75 MG/DL (ref 0.57–2.63)

## 2024-04-22 NOTE — PROGRESS NOTES
"  Subjective:       Patient ID: Darian Morales is a 48 y.o. male.    ITP--Diagnosed 4/18/24      Work-up:  4/18/24--peripheral smear with severe thrombocytopenia, occasional giant forms, no schistocytes or clumps; vitamin B12 371, folate 9.2, normal PT and PTT, fibrinogen normal, GAYLE negative, hepatitis panel and HIV negative, Platelet IgM strongly positive.    Imaging:  CT C/A/P 4/19/24--Nonspecific lymphadenopathy seen in the axillary regions bilaterally, mild hepatomegaly and some hepatic steatosis.  CT brain w/o contrast 4/18/24--No acute intracranial abnormality identified.     Treatment history:  Platelet transfusion 4/18/24.  Decadron 40mg IV X 4 days completed 4/21/24 + IVIG X 2 days completed 4/20/24.      Current treatment plan: Observation    Chief Complaint: Dizziness (Pt reports feeling a pulsing in the base of his neck. Pt had eyes checked yesterday. He is trying to determine why he is feeling so dizzy. Pt reports dizziness began since he was in the hospital. Pt reports that his L arm and L leg sometimes feels "dead".)    HPI  Patient presents for follow-up of ITP. Platelets are good today 169. Remainder of CBC is normal. He c/o feeling weak since he was in the hospital. He has some spells where he feels lightheaded and dizzy and at times, his legs will feel weak. He thinks he may just be deconditioned following hospitalization.     No past medical history on file.   No past surgical history on file.  No family history on file.  Social History     Socioeconomic History    Marital status:    Tobacco Use    Smoking status: Never    Smokeless tobacco: Never   Substance and Sexual Activity    Alcohol use: Yes     Comment: occasionally    Drug use: Never     Social Determinants of Health     Financial Resource Strain: Low Risk  (4/20/2024)    Overall Financial Resource Strain (CARDIA)     Difficulty of Paying Living Expenses: Not very hard   Food Insecurity: No Food Insecurity (4/20/2024)    " Hunger Vital Sign     Worried About Running Out of Food in the Last Year: Never true     Ran Out of Food in the Last Year: Never true   Transportation Needs: No Transportation Needs (4/20/2024)    PRAPARE - Transportation     Lack of Transportation (Medical): No     Lack of Transportation (Non-Medical): No   Stress: No Stress Concern Present (4/20/2024)    [employer] [place] [employer] - Occupational Stress Questionnaire     Feeling of Stress : Not at all   Social Connections: Unknown (4/19/2024)    Social Connection and Isolation Panel [NHANES]     Frequency of Communication with Friends and Family: More than three times a week     Frequency of Social Gatherings with Friends and Family: More than three times a week     Marital Status:    Housing Stability: Low Risk  (4/20/2024)    Housing Stability Vital Sign     Unable to Pay for Housing in the Last Year: No     Number of Times Moved in the Last Year: 0     Homeless in the Last Year: No       Review of patient's allergies indicates:  No Known Allergies   No current outpatient medications on file prior to visit.     No current facility-administered medications on file prior to visit.      Review of Systems   Constitutional:  Negative for appetite change and unexpected weight change.   HENT:  Negative for mouth sores.    Eyes:  Positive for visual disturbance.   Respiratory:  Negative for cough and shortness of breath.    Cardiovascular:  Negative for chest pain.   Gastrointestinal:  Negative for abdominal pain and diarrhea.   Genitourinary:  Negative for frequency.   Musculoskeletal:  Negative for back pain.   Integumentary:  Negative for rash.   Neurological:  Positive for weakness and light-headedness. Negative for headaches.   Hematological:  Negative for adenopathy.   Psychiatric/Behavioral:  The patient is nervous/anxious.             Vitals:    04/26/24 0941   BP: (!) 139/96   Pulse: 74   Resp: 14   Temp: 98.2 °F (36.8 °C)   TempSrc: Oral  "  SpO2: 97%   Weight: 91.3 kg (201 lb 4.8 oz)   Height: 5' 7" (1.702 m)      Physical Exam  Constitutional:       General: He is awake.      Appearance: Normal appearance.   HENT:      Head: Normocephalic and atraumatic.      Nose: Nose normal.      Mouth/Throat:      Mouth: Mucous membranes are moist.   Eyes:      General: Vision grossly intact.      Extraocular Movements: Extraocular movements intact.      Conjunctiva/sclera: Conjunctivae normal.   Cardiovascular:      Rate and Rhythm: Normal rate and regular rhythm.      Heart sounds: Normal heart sounds.   Pulmonary:      Effort: Pulmonary effort is normal.      Breath sounds: Normal breath sounds.   Abdominal:      General: Bowel sounds are normal. There is no distension.      Palpations: Abdomen is soft.      Tenderness: There is no abdominal tenderness.   Musculoskeletal:      Cervical back: Normal range of motion and neck supple.      Right lower leg: No edema.      Left lower leg: No edema.   Lymphadenopathy:      Cervical: No cervical adenopathy.      Upper Body:      Right upper body: No supraclavicular adenopathy.      Left upper body: No supraclavicular adenopathy.   Skin:     General: Skin is warm.   Neurological:      Mental Status: He is alert and oriented to person, place, and time.      Motor: Motor function is intact.   Psychiatric:         Mood and Affect: Mood normal.         Speech: Speech normal.         Behavior: Behavior is cooperative.         Judgment: Judgment normal.       Lab Visit on 04/26/2024   Component Date Value Ref Range Status    WBC 04/26/2024 5.51  4.50 - 11.50 x10(3)/mcL Final    RBC 04/26/2024 5.54  4.70 - 6.10 x10(6)/mcL Final    Hgb 04/26/2024 16.3  14.0 - 18.0 g/dL Final    Hct 04/26/2024 46.9  42.0 - 52.0 % Final    MCV 04/26/2024 84.7  80.0 - 94.0 fL Final    MCH 04/26/2024 29.4  27.0 - 31.0 pg Final    MCHC 04/26/2024 34.8  33.0 - 36.0 g/dL Final    RDW 04/26/2024 12.5  11.5 - 17.0 % Final    Platelet 04/26/2024 169  " 130 - 400 x10(3)/mcL Final    MPV 04/26/2024 8.7  7.4 - 10.4 fL Final    Neut % 04/26/2024 49.9  % Final    Lymph % 04/26/2024 34.3  % Final    Mono % 04/26/2024 11.6  % Final    Eos % 04/26/2024 2.9  % Final    Basophil % 04/26/2024 0.2  % Final    Lymph # 04/26/2024 1.89  0.6 - 4.6 x10(3)/mcL Final    Neut # 04/26/2024 2.75  2.1 - 9.2 x10(3)/mcL Final    Mono # 04/26/2024 0.64  0.1 - 1.3 x10(3)/mcL Final    Eos # 04/26/2024 0.16  0 - 0.9 x10(3)/mcL Final    Baso # 04/26/2024 0.01  <=0.2 x10(3)/mcL Final    IG# 04/26/2024 0.06 (H)  0 - 0.04 x10(3)/mcL Final    IG% 04/26/2024 1.1  % Final   No results displayed because visit has over 200 results.                     Assessment:       1. Idiopathic thrombocytopenic purpura (ITP)         Plan:       Patient with ITP, diagnosed 4/2024.  S/p IVIG X 2 days and 4 days of IV decadron 40mg completed 4/21/24.     Platelets today are much improved.     CT C/A/P did show non-specific axillary adenopathy, but otherwise normal.   Plan to repeat CT chest in 8/2024.     Patient with weak/dizzy spells. Unclear etiology. ? Deconditioning from hospitalization.   Suggested cardiac work-up and he will discuss with PCP.     Weekly CBCdiff to be done in [place].     Briefly discussed multiple other treatment options including Promacta, Rituximab and splenectomy if ITP recurs.     F/u in 1 month with labs and visit.     All questions answered at this time.    Sheila Kuhn MD

## 2024-04-24 ENCOUNTER — PATIENT MESSAGE (OUTPATIENT)
Dept: ADMINISTRATIVE | Facility: OTHER | Age: 49
End: 2024-04-24
Payer: COMMERCIAL

## 2024-04-26 ENCOUNTER — OFFICE VISIT (OUTPATIENT)
Dept: HEMATOLOGY/ONCOLOGY | Facility: CLINIC | Age: 49
End: 2024-04-26
Payer: COMMERCIAL

## 2024-04-26 ENCOUNTER — LAB VISIT (OUTPATIENT)
Dept: LAB | Facility: HOSPITAL | Age: 49
End: 2024-04-26
Attending: INTERNAL MEDICINE
Payer: COMMERCIAL

## 2024-04-26 VITALS
TEMPERATURE: 98 F | WEIGHT: 201.31 LBS | HEIGHT: 67 IN | HEART RATE: 74 BPM | RESPIRATION RATE: 14 BRPM | SYSTOLIC BLOOD PRESSURE: 139 MMHG | OXYGEN SATURATION: 97 % | DIASTOLIC BLOOD PRESSURE: 96 MMHG | BODY MASS INDEX: 31.6 KG/M2

## 2024-04-26 DIAGNOSIS — D69.3 IDIOPATHIC THROMBOCYTOPENIC PURPURA (ITP): Primary | ICD-10-CM

## 2024-04-26 DIAGNOSIS — D69.3 ACUTE IDIOPATHIC THROMBOCYTOPENIC PURPURA: ICD-10-CM

## 2024-04-26 LAB
BASOPHILS # BLD AUTO: 0.01 X10(3)/MCL
BASOPHILS NFR BLD AUTO: 0.2 %
EOSINOPHIL # BLD AUTO: 0.16 X10(3)/MCL (ref 0–0.9)
EOSINOPHIL NFR BLD AUTO: 2.9 %
ERYTHROCYTE [DISTWIDTH] IN BLOOD BY AUTOMATED COUNT: 12.5 % (ref 11.5–17)
HCT VFR BLD AUTO: 46.9 % (ref 42–52)
HGB BLD-MCNC: 16.3 G/DL (ref 14–18)
IMM GRANULOCYTES # BLD AUTO: 0.06 X10(3)/MCL (ref 0–0.04)
IMM GRANULOCYTES NFR BLD AUTO: 1.1 %
LYMPHOCYTES # BLD AUTO: 1.89 X10(3)/MCL (ref 0.6–4.6)
LYMPHOCYTES NFR BLD AUTO: 34.3 %
MCH RBC QN AUTO: 29.4 PG (ref 27–31)
MCHC RBC AUTO-ENTMCNC: 34.8 G/DL (ref 33–36)
MCV RBC AUTO: 84.7 FL (ref 80–94)
MONOCYTES # BLD AUTO: 0.64 X10(3)/MCL (ref 0.1–1.3)
MONOCYTES NFR BLD AUTO: 11.6 %
NEUTROPHILS # BLD AUTO: 2.75 X10(3)/MCL (ref 2.1–9.2)
NEUTROPHILS NFR BLD AUTO: 49.9 %
PLATELET # BLD AUTO: 169 X10(3)/MCL (ref 130–400)
PMV BLD AUTO: 8.7 FL (ref 7.4–10.4)
RBC # BLD AUTO: 5.54 X10(6)/MCL (ref 4.7–6.1)
WBC # SPEC AUTO: 5.51 X10(3)/MCL (ref 4.5–11.5)

## 2024-04-26 PROCEDURE — 3080F DIAST BP >= 90 MM HG: CPT | Mod: CPTII,S$GLB,, | Performed by: INTERNAL MEDICINE

## 2024-04-26 PROCEDURE — 36415 COLL VENOUS BLD VENIPUNCTURE: CPT

## 2024-04-26 PROCEDURE — 85025 COMPLETE CBC W/AUTO DIFF WBC: CPT

## 2024-04-26 PROCEDURE — 99999 PR PBB SHADOW E&M-EST. PATIENT-LVL III: CPT | Mod: PBBFAC,,, | Performed by: INTERNAL MEDICINE

## 2024-04-26 PROCEDURE — 1159F MED LIST DOCD IN RCRD: CPT | Mod: CPTII,S$GLB,, | Performed by: INTERNAL MEDICINE

## 2024-04-26 PROCEDURE — 99215 OFFICE O/P EST HI 40 MIN: CPT | Mod: S$GLB,,, | Performed by: INTERNAL MEDICINE

## 2024-04-26 PROCEDURE — 3008F BODY MASS INDEX DOCD: CPT | Mod: CPTII,S$GLB,, | Performed by: INTERNAL MEDICINE

## 2024-04-26 PROCEDURE — 1160F RVW MEDS BY RX/DR IN RCRD: CPT | Mod: CPTII,S$GLB,, | Performed by: INTERNAL MEDICINE

## 2024-04-26 PROCEDURE — 3075F SYST BP GE 130 - 139MM HG: CPT | Mod: CPTII,S$GLB,, | Performed by: INTERNAL MEDICINE

## 2024-04-26 PROCEDURE — 1111F DSCHRG MED/CURRENT MED MERGE: CPT | Mod: CPTII,S$GLB,, | Performed by: INTERNAL MEDICINE

## 2024-04-30 DIAGNOSIS — R59.0 LOCALIZED ENLARGED LYMPH NODES: Primary | ICD-10-CM

## 2024-05-01 ENCOUNTER — LAB VISIT (OUTPATIENT)
Dept: LAB | Facility: HOSPITAL | Age: 49
End: 2024-05-01
Attending: INTERNAL MEDICINE
Payer: COMMERCIAL

## 2024-05-01 DIAGNOSIS — D69.3 IDIOPATHIC THROMBOCYTOPENIC PURPURA (ITP): ICD-10-CM

## 2024-05-01 LAB
ALBUMIN SERPL-MCNC: 3.8 G/DL (ref 3.5–5)
ALBUMIN/GLOB SERPL: 0.8 RATIO (ref 1.1–2)
ALP SERPL-CCNC: 62 UNIT/L (ref 40–150)
ALT SERPL-CCNC: 165 UNIT/L (ref 0–55)
AST SERPL-CCNC: 74 UNIT/L (ref 5–34)
BASOPHILS # BLD AUTO: 0.07 X10(3)/MCL
BASOPHILS NFR BLD AUTO: 1 %
BILIRUB SERPL-MCNC: 0.8 MG/DL
BUN SERPL-MCNC: 12 MG/DL (ref 8.9–20.6)
CALCIUM SERPL-MCNC: 9.6 MG/DL (ref 8.4–10.2)
CHLORIDE SERPL-SCNC: 102 MMOL/L (ref 98–107)
CO2 SERPL-SCNC: 24 MMOL/L (ref 22–29)
CREAT SERPL-MCNC: 1.16 MG/DL (ref 0.73–1.18)
EOSINOPHIL # BLD AUTO: 0.1 X10(3)/MCL (ref 0–0.9)
EOSINOPHIL NFR BLD AUTO: 1.4 %
ERYTHROCYTE [DISTWIDTH] IN BLOOD BY AUTOMATED COUNT: 12.6 % (ref 11.5–17)
GFR SERPLBLD CREATININE-BSD FMLA CKD-EPI: >60 MLS/MIN/1.73/M2
GLOBULIN SER-MCNC: 5 GM/DL (ref 2.4–3.5)
GLUCOSE SERPL-MCNC: 95 MG/DL (ref 74–100)
HCT VFR BLD AUTO: 48.4 % (ref 42–52)
HGB BLD-MCNC: 16.5 G/DL (ref 14–18)
IMM GRANULOCYTES # BLD AUTO: 0.04 X10(3)/MCL (ref 0–0.04)
IMM GRANULOCYTES NFR BLD AUTO: 0.6 %
LYMPHOCYTES # BLD AUTO: 2.04 X10(3)/MCL (ref 0.6–4.6)
LYMPHOCYTES NFR BLD AUTO: 28.5 %
MCH RBC QN AUTO: 29.3 PG (ref 27–31)
MCHC RBC AUTO-ENTMCNC: 34.1 G/DL (ref 33–36)
MCV RBC AUTO: 86 FL (ref 80–94)
MONOCYTES # BLD AUTO: 0.58 X10(3)/MCL (ref 0.1–1.3)
MONOCYTES NFR BLD AUTO: 8.1 %
NEUTROPHILS # BLD AUTO: 4.32 X10(3)/MCL (ref 2.1–9.2)
NEUTROPHILS NFR BLD AUTO: 60.4 %
PLATELET # BLD AUTO: 63 X10(3)/MCL (ref 130–400)
PMV BLD AUTO: 10.8 FL (ref 7.4–10.4)
POTASSIUM SERPL-SCNC: 3.7 MMOL/L (ref 3.5–5.1)
PROT SERPL-MCNC: 8.8 GM/DL (ref 6.4–8.3)
RBC # BLD AUTO: 5.63 X10(6)/MCL (ref 4.7–6.1)
SODIUM SERPL-SCNC: 134 MMOL/L (ref 136–145)
WBC # SPEC AUTO: 7.15 X10(3)/MCL (ref 4.5–11.5)

## 2024-05-01 PROCEDURE — 80053 COMPREHEN METABOLIC PANEL: CPT

## 2024-05-01 PROCEDURE — 36415 COLL VENOUS BLD VENIPUNCTURE: CPT

## 2024-05-01 PROCEDURE — 85025 COMPLETE CBC W/AUTO DIFF WBC: CPT

## 2024-05-01 NOTE — PROGRESS NOTES
Please let patient know that his platelet count is 63K. I do expect it to drop further by next week so I would like to see him on Monday instead of waiting until Wed to get his labs done. If he has an bruising over the weekend, or other issues, he needs to report to ED. I will try to start working on insurance approval for medication Promacta.    Keren, can you sen prescription for insurance approval for Promacta?    Leona, please reschedule labs and office visit to Monday.  Thank you!

## 2024-05-02 DIAGNOSIS — R74.01 NONSPECIFIC ELEVATION OF LEVELS OF TRANSAMINASE OR LACTIC ACID DEHYDROGENASE (LDH): Primary | ICD-10-CM

## 2024-05-02 DIAGNOSIS — D69.3 IDIOPATHIC THROMBOCYTOPENIC PURPURA (ITP): Primary | ICD-10-CM

## 2024-05-02 DIAGNOSIS — R74.02 NONSPECIFIC ELEVATION OF LEVELS OF TRANSAMINASE OR LACTIC ACID DEHYDROGENASE (LDH): Primary | ICD-10-CM

## 2024-05-02 NOTE — PROGRESS NOTES
Subjective:       Patient ID: Darian Morales is a 48 y.o. male.    ITP--Diagnosed 24    Work-up:  24--peripheral smear with severe thrombocytopenia, occasional giant forms, no schistocytes or clumps; vitamin B12 371, folate 9.2, normal PT and PTT, fibrinogen normal, GAYLE negative, hepatitis panel and HIV negative, Platelet IgM strongly positive.    Imagin. CT C/A/P 24--Nonspecific lymphadenopathy seen in the axillary regions bilaterally, mild hepatomegaly and some hepatic steatosis.  2. CT brain w/o contrast 24--No acute intracranial abnormality identified.   3. CTA chest done at [facility] 24--enlarged right axillary nodes, with largest measuring 3cm, left axilla is unremarkable.  4. CT soft tissue neck w/ and w/o contrast done 5/3/24--No convincing acute abnormality.  Focal right parapharyngeal calcification is nonspecific, differential includes sialolithiasis versus sequela of chronic tonsillar inflammation.  5. US abdomen/liver done 5/3/24--Hyperechoic appearance of the liver is suggestive of steatosis, otherwise, unremarkable sonographic evaluation.    Treatment history:  Platelet transfusion 24.  Decadron 40mg IV X 4 days completed 24 + IVIG X 2 days completed 24.    Current treatment plan:   Observation  Start Promacta if platelets drop <30K    Chief Complaint: Fatigue (Pt reports that he is very weak. States that PCP did CT and has salivary stones. PCP has sent referral to ENT.)    HPI  Patient presents for follow-up of ITP. Platelets dropped last week to 63K, but up today 67K. Patient reports that he remains every weak. He is unable to stand for long. His PCP did a CT due to throat pain and it showed a possible stone in parapharyngeal area and he has been referred to ENT. He reports that he cannot eat much and he feels at times that his throat will close up. He cried in the room today because he is frustrated. He is asking about disability as he is unable to work  at this time.     No past medical history on file.   No past surgical history on file.  No family history on file.  Social History     Socioeconomic History    Marital status:    Tobacco Use    Smoking status: Never    Smokeless tobacco: Never   Substance and Sexual Activity    Alcohol use: Yes     Comment: occasionally    Drug use: Never     Social Determinants of Health     Financial Resource Strain: Low Risk  (4/20/2024)    Overall Financial Resource Strain (CARDIA)     Difficulty of Paying Living Expenses: Not very hard   Food Insecurity: No Food Insecurity (4/20/2024)    Hunger Vital Sign     Worried About Running Out of Food in the Last Year: Never true     Ran Out of Food in the Last Year: Never true   Transportation Needs: No Transportation Needs (4/20/2024)    PRAPARE - Transportation     Lack of Transportation (Medical): No     Lack of Transportation (Non-Medical): No   Stress: No Stress Concern Present (4/20/2024)    [employer] - Occupational Stress Questionnaire     Feeling of Stress : Not at all   Housing Stability: Low Risk  (4/20/2024)    Housing Stability Vital Sign     Unable to Pay for Housing in the Last Year: No     Homeless in the Last Year: No       Review of patient's allergies indicates:  No Known Allergies   Current Outpatient Medications on File Prior to Visit   Medication Sig Dispense Refill    LEVSIN 0.125 mg Tab Take 125 mcg by mouth.      eltrombopag olamine (PROMACTA) 50 MG Tab Take 1 tablet (50 mg total) by mouth once daily. (Patient not taking: Reported on 5/6/2024) 30 tablet 5     No current facility-administered medications on file prior to visit.      Review of Systems   Constitutional:  Negative for appetite change and unexpected weight change.   HENT:  Negative for mouth sores.    Respiratory:  Negative for cough and shortness of breath.    Cardiovascular:  Negative for chest pain.   Gastrointestinal:  Negative for abdominal pain and  "diarrhea.   Genitourinary:  Negative for frequency.   Musculoskeletal:  Negative for back pain.   Integumentary:  Negative for rash.   Neurological:  Positive for weakness and light-headedness. Negative for headaches.   Hematological:  Negative for adenopathy.   Psychiatric/Behavioral:  The patient is nervous/anxious.             Vitals:    05/06/24 1049   BP: 123/85   Pulse: 98   Resp: 14   Temp: 98.3 °F (36.8 °C)   TempSrc: Oral   SpO2: 98%   Weight: 87 kg (191 lb 12.8 oz)   Height: 5' 7" (1.702 m)        Wt Readings from Last 3 Encounters:   05/06/24 1049 87 kg (191 lb 12.8 oz)   04/26/24 0941 91.3 kg (201 lb 4.8 oz)   04/18/24 1832 92.5 kg (204 lb)   04/18/24 1440 92.5 kg (204 lb)       Physical Exam  Constitutional:       General: He is awake.      Appearance: Normal appearance.   HENT:      Head: Normocephalic and atraumatic.      Nose: Nose normal.      Mouth/Throat:      Mouth: Mucous membranes are moist.   Eyes:      General: Vision grossly intact.      Extraocular Movements: Extraocular movements intact.      Conjunctiva/sclera: Conjunctivae normal.   Cardiovascular:      Rate and Rhythm: Normal rate and regular rhythm.      Heart sounds: Normal heart sounds.   Pulmonary:      Effort: Pulmonary effort is normal.      Breath sounds: Normal breath sounds.   Abdominal:      General: Bowel sounds are normal. There is no distension.      Palpations: Abdomen is soft.      Tenderness: There is no abdominal tenderness.   Musculoskeletal:      Cervical back: Normal range of motion and neck supple.      Right lower leg: No edema.      Left lower leg: No edema.   Lymphadenopathy:      Cervical: No cervical adenopathy.      Upper Body:      Right upper body: No supraclavicular adenopathy.      Left upper body: No supraclavicular adenopathy.   Skin:     General: Skin is warm.   Neurological:      Mental Status: He is alert and oriented to person, place, and time.      Motor: Motor function is intact.   Psychiatric:    "      Mood and Affect: Mood normal.         Speech: Speech normal.         Behavior: Behavior is cooperative.         Judgment: Judgment normal.       Lab Visit on 05/06/2024   Component Date Value Ref Range Status    WBC 05/06/2024 5.93  4.50 - 11.50 x10(3)/mcL Final    RBC 05/06/2024 5.64  4.70 - 6.10 x10(6)/mcL Final    Hgb 05/06/2024 16.9  14.0 - 18.0 g/dL Final    Hct 05/06/2024 47.5  42.0 - 52.0 % Final    MCV 05/06/2024 84.2  80.0 - 94.0 fL Final    MCH 05/06/2024 30.0  27.0 - 31.0 pg Final    MCHC 05/06/2024 35.6  33.0 - 36.0 g/dL Final    RDW 05/06/2024 12.1  11.5 - 17.0 % Final    Platelet 05/06/2024 67 (L)  130 - 400 x10(3)/mcL Final    MPV 05/06/2024 10.3  7.4 - 10.4 fL Final    Neut % 05/06/2024 62.4  % Final    Lymph % 05/06/2024 25.8  % Final    Mono % 05/06/2024 9.3  % Final    Eos % 05/06/2024 1.3  % Final    Basophil % 05/06/2024 1.0  % Final    Lymph # 05/06/2024 1.53  0.6 - 4.6 x10(3)/mcL Final    Neut # 05/06/2024 3.70  2.1 - 9.2 x10(3)/mcL Final    Mono # 05/06/2024 0.55  0.1 - 1.3 x10(3)/mcL Final    Eos # 05/06/2024 0.08  0 - 0.9 x10(3)/mcL Final    Baso # 05/06/2024 0.06  <=0.2 x10(3)/mcL Final    IG# 05/06/2024 0.01  0 - 0.04 x10(3)/mcL Final    IG% 05/06/2024 0.2  % Final   Lab Visit on 05/01/2024   Component Date Value Ref Range Status    Sodium Level 05/01/2024 134 (L)  136 - 145 mmol/L Final    Potassium Level 05/01/2024 3.7  3.5 - 5.1 mmol/L Final    Chloride 05/01/2024 102  98 - 107 mmol/L Final    Carbon Dioxide 05/01/2024 24  22 - 29 mmol/L Final    Glucose Level 05/01/2024 95  74 - 100 mg/dL Final    Blood Urea Nitrogen 05/01/2024 12.0  8.9 - 20.6 mg/dL Final    Creatinine 05/01/2024 1.16  0.73 - 1.18 mg/dL Final    Calcium Level Total 05/01/2024 9.6  8.4 - 10.2 mg/dL Final    Protein Total 05/01/2024 8.8 (H)  6.4 - 8.3 gm/dL Final    Albumin Level 05/01/2024 3.8  3.5 - 5.0 g/dL Final    Globulin 05/01/2024 5.0 (H)  2.4 - 3.5 gm/dL Final    Albumin/Globulin Ratio 05/01/2024 0.8 (L)   1.1 - 2.0 ratio Final    Bilirubin Total 05/01/2024 0.8  <=1.5 mg/dL Final    Alkaline Phosphatase 05/01/2024 62  40 - 150 unit/L Final    Alanine Aminotransferase 05/01/2024 165 (H)  0 - 55 unit/L Final    Aspartate Aminotransferase 05/01/2024 74 (H)  5 - 34 unit/L Final    eGFR 05/01/2024 >60  mls/min/1.73/m2 Final    WBC 05/01/2024 7.15  4.50 - 11.50 x10(3)/mcL Final    RBC 05/01/2024 5.63  4.70 - 6.10 x10(6)/mcL Final    Hgb 05/01/2024 16.5  14.0 - 18.0 g/dL Final    Hct 05/01/2024 48.4  42.0 - 52.0 % Final    MCV 05/01/2024 86.0  80.0 - 94.0 fL Final    MCH 05/01/2024 29.3  27.0 - 31.0 pg Final    MCHC 05/01/2024 34.1  33.0 - 36.0 g/dL Final    RDW 05/01/2024 12.6  11.5 - 17.0 % Final    Platelet 05/01/2024 63 (L)  130 - 400 x10(3)/mcL Final    MPV 05/01/2024 10.8 (H)  7.4 - 10.4 fL Final    Neut % 05/01/2024 60.4  % Final    Lymph % 05/01/2024 28.5  % Final    Mono % 05/01/2024 8.1  % Final    Eos % 05/01/2024 1.4  % Final    Basophil % 05/01/2024 1.0  % Final    Lymph # 05/01/2024 2.04  0.6 - 4.6 x10(3)/mcL Final    Neut # 05/01/2024 4.32  2.1 - 9.2 x10(3)/mcL Final    Mono # 05/01/2024 0.58  0.1 - 1.3 x10(3)/mcL Final    Eos # 05/01/2024 0.10  0 - 0.9 x10(3)/mcL Final    Baso # 05/01/2024 0.07  <=0.2 x10(3)/mcL Final    IG# 05/01/2024 0.04  0 - 0.04 x10(3)/mcL Final    IG% 05/01/2024 0.6  % Final         Assessment:       1. Idiopathic thrombocytopenic purpura (ITP)        Plan:       Patient with ITP, diagnosed 4/2024.  S/p IVIG X 2 days and 4 days of IV decadron 40mg completed 4/21/24.     Platelets improved to 169, dropped again to 63, now 67 today.  Promacta ordered and insurance approved.  Will be sure he has on hand, but I don't want him to start unless platelets drop <30K.      CT C/A/P from 4/19/24 did show non-specific axillary adenopathy, but otherwise normal.   He had another CTA chest at [facility] on 4/27/24 when he went back to ED for chest pain. They mentioned again the right axillary enlarged  nodes, measuring up to 3cm.      Patient continues with weak/dizzy spells. Also diagnosed with salivary gland stone an referred to ENT. He has had some trouble eating.  Noted weight loss of 10lbs. LDH was normal in 4/2024.    Will refer to Dr. Sonu Lozano for axillary LN excisional biopsy.     I have suggested that he contact his PCP to start some PT.  Continue checking CBCdiff weekly.  He did have a liver US for elevated LFTs and it showed fatty liver.    F/u in 3 weeks.     All questions answered at this time.    Sheila Kuhn MD

## 2024-05-03 ENCOUNTER — HOSPITAL ENCOUNTER (OUTPATIENT)
Dept: RADIOLOGY | Facility: HOSPITAL | Age: 49
Discharge: HOME | End: 2024-05-03
Attending: FAMILY MEDICINE
Payer: COMMERCIAL

## 2024-05-03 DIAGNOSIS — R74.01 NONSPECIFIC ELEVATION OF LEVELS OF TRANSAMINASE OR LACTIC ACID DEHYDROGENASE (LDH): ICD-10-CM

## 2024-05-03 DIAGNOSIS — I88.9 ADENITIS: ICD-10-CM

## 2024-05-03 DIAGNOSIS — I88.9 ADENITIS: Primary | ICD-10-CM

## 2024-05-03 DIAGNOSIS — R74.02 NONSPECIFIC ELEVATION OF LEVELS OF TRANSAMINASE OR LACTIC ACID DEHYDROGENASE (LDH): ICD-10-CM

## 2024-05-03 PROCEDURE — 76705 ECHO EXAM OF ABDOMEN: CPT | Mod: TC

## 2024-05-03 PROCEDURE — 70492 CT SFT TSUE NCK W/O & W/DYE: CPT | Mod: TC

## 2024-05-03 PROCEDURE — 25500020 PHARM REV CODE 255: Performed by: FAMILY MEDICINE

## 2024-05-03 RX ADMIN — IOPAMIDOL 100 ML: 755 INJECTION, SOLUTION INTRAVENOUS at 10:05

## 2024-05-06 ENCOUNTER — OFFICE VISIT (OUTPATIENT)
Dept: HEMATOLOGY/ONCOLOGY | Facility: CLINIC | Age: 49
End: 2024-05-06
Payer: COMMERCIAL

## 2024-05-06 ENCOUNTER — LAB VISIT (OUTPATIENT)
Dept: LAB | Facility: HOSPITAL | Age: 49
End: 2024-05-06
Attending: INTERNAL MEDICINE
Payer: COMMERCIAL

## 2024-05-06 VITALS
RESPIRATION RATE: 14 BRPM | DIASTOLIC BLOOD PRESSURE: 85 MMHG | TEMPERATURE: 98 F | HEIGHT: 67 IN | OXYGEN SATURATION: 98 % | WEIGHT: 191.81 LBS | SYSTOLIC BLOOD PRESSURE: 123 MMHG | BODY MASS INDEX: 30.1 KG/M2 | HEART RATE: 98 BPM

## 2024-05-06 DIAGNOSIS — R59.0 AXILLARY ADENOPATHY: ICD-10-CM

## 2024-05-06 DIAGNOSIS — D69.3 IDIOPATHIC THROMBOCYTOPENIC PURPURA (ITP): Primary | ICD-10-CM

## 2024-05-06 DIAGNOSIS — D69.3 IDIOPATHIC THROMBOCYTOPENIC PURPURA (ITP): ICD-10-CM

## 2024-05-06 LAB
BASOPHILS # BLD AUTO: 0.06 X10(3)/MCL
BASOPHILS NFR BLD AUTO: 1 %
EOSINOPHIL # BLD AUTO: 0.08 X10(3)/MCL (ref 0–0.9)
EOSINOPHIL NFR BLD AUTO: 1.3 %
ERYTHROCYTE [DISTWIDTH] IN BLOOD BY AUTOMATED COUNT: 12.1 % (ref 11.5–17)
HCT VFR BLD AUTO: 47.5 % (ref 42–52)
HGB BLD-MCNC: 16.9 G/DL (ref 14–18)
IMM GRANULOCYTES # BLD AUTO: 0.01 X10(3)/MCL (ref 0–0.04)
IMM GRANULOCYTES NFR BLD AUTO: 0.2 %
LYMPHOCYTES # BLD AUTO: 1.53 X10(3)/MCL (ref 0.6–4.6)
LYMPHOCYTES NFR BLD AUTO: 25.8 %
MCH RBC QN AUTO: 30 PG (ref 27–31)
MCHC RBC AUTO-ENTMCNC: 35.6 G/DL (ref 33–36)
MCV RBC AUTO: 84.2 FL (ref 80–94)
MONOCYTES # BLD AUTO: 0.55 X10(3)/MCL (ref 0.1–1.3)
MONOCYTES NFR BLD AUTO: 9.3 %
NEUTROPHILS # BLD AUTO: 3.7 X10(3)/MCL (ref 2.1–9.2)
NEUTROPHILS NFR BLD AUTO: 62.4 %
PLATELET # BLD AUTO: 67 X10(3)/MCL (ref 130–400)
PMV BLD AUTO: 10.3 FL (ref 7.4–10.4)
RBC # BLD AUTO: 5.64 X10(6)/MCL (ref 4.7–6.1)
WBC # SPEC AUTO: 5.93 X10(3)/MCL (ref 4.5–11.5)

## 2024-05-06 PROCEDURE — 1111F DSCHRG MED/CURRENT MED MERGE: CPT | Mod: CPTII,S$GLB,, | Performed by: INTERNAL MEDICINE

## 2024-05-06 PROCEDURE — 3074F SYST BP LT 130 MM HG: CPT | Mod: CPTII,S$GLB,, | Performed by: INTERNAL MEDICINE

## 2024-05-06 PROCEDURE — 1159F MED LIST DOCD IN RCRD: CPT | Mod: CPTII,S$GLB,, | Performed by: INTERNAL MEDICINE

## 2024-05-06 PROCEDURE — 3008F BODY MASS INDEX DOCD: CPT | Mod: CPTII,S$GLB,, | Performed by: INTERNAL MEDICINE

## 2024-05-06 PROCEDURE — 1160F RVW MEDS BY RX/DR IN RCRD: CPT | Mod: CPTII,S$GLB,, | Performed by: INTERNAL MEDICINE

## 2024-05-06 PROCEDURE — 3079F DIAST BP 80-89 MM HG: CPT | Mod: CPTII,S$GLB,, | Performed by: INTERNAL MEDICINE

## 2024-05-06 PROCEDURE — 99999 PR PBB SHADOW E&M-EST. PATIENT-LVL IV: CPT | Mod: PBBFAC,,, | Performed by: INTERNAL MEDICINE

## 2024-05-06 PROCEDURE — 85025 COMPLETE CBC W/AUTO DIFF WBC: CPT

## 2024-05-06 PROCEDURE — 99214 OFFICE O/P EST MOD 30 MIN: CPT | Mod: S$GLB,,, | Performed by: INTERNAL MEDICINE

## 2024-05-06 PROCEDURE — 36415 COLL VENOUS BLD VENIPUNCTURE: CPT

## 2024-05-06 RX ORDER — HYOSCYAMINE SULFATE 0.12 MG/1
125 TABLET ORAL
COMMUNITY
Start: 2024-05-02

## 2024-05-07 ENCOUNTER — LAB VISIT (OUTPATIENT)
Dept: LAB | Facility: HOSPITAL | Age: 49
End: 2024-05-07
Attending: FAMILY MEDICINE
Payer: COMMERCIAL

## 2024-05-07 DIAGNOSIS — I88.9 ADENITIS: ICD-10-CM

## 2024-05-07 DIAGNOSIS — R53.83 FATIGUE, UNSPECIFIED TYPE: ICD-10-CM

## 2024-05-07 DIAGNOSIS — R74.01 NONSPECIFIC ELEVATION OF LEVELS OF TRANSAMINASE OR LACTIC ACID DEHYDROGENASE (LDH): ICD-10-CM

## 2024-05-07 DIAGNOSIS — R74.02 NONSPECIFIC ELEVATION OF LEVELS OF TRANSAMINASE OR LACTIC ACID DEHYDROGENASE (LDH): ICD-10-CM

## 2024-05-07 DIAGNOSIS — D69.3 IDIOPATHIC THROMBOCYTOPENIC PURPURA (ITP): ICD-10-CM

## 2024-05-07 DIAGNOSIS — R53.1 ASTHENIA: Primary | ICD-10-CM

## 2024-05-07 LAB
ALBUMIN SERPL-MCNC: 3.8 G/DL (ref 3.5–5)
ALBUMIN/GLOB SERPL: 0.8 RATIO (ref 1.1–2)
ALP SERPL-CCNC: 59 UNIT/L (ref 40–150)
ALT SERPL-CCNC: 73 UNIT/L (ref 0–55)
AST SERPL-CCNC: 33 UNIT/L (ref 5–34)
BASOPHILS # BLD AUTO: 0.05 X10(3)/MCL
BASOPHILS NFR BLD AUTO: 1.1 %
BILIRUB SERPL-MCNC: 0.6 MG/DL
BILIRUBIN DIRECT+TOT PNL SERPL-MCNC: 0.2 MG/DL (ref 0–?)
BUN SERPL-MCNC: 10 MG/DL (ref 8.9–20.6)
CALCIUM SERPL-MCNC: 9.1 MG/DL (ref 8.4–10.2)
CHLORIDE SERPL-SCNC: 104 MMOL/L (ref 98–107)
CK SERPL-CCNC: 34 U/L (ref 30–200)
CO2 SERPL-SCNC: 22 MMOL/L (ref 22–29)
CREAT SERPL-MCNC: 1.16 MG/DL (ref 0.73–1.18)
DEPRECATED CALCIDIOL+CALCIFEROL SERPL-MC: 29.8 NG/ML (ref 30–80)
EOSINOPHIL # BLD AUTO: 0.08 X10(3)/MCL (ref 0–0.9)
EOSINOPHIL NFR BLD AUTO: 1.7 %
ERYTHROCYTE [DISTWIDTH] IN BLOOD BY AUTOMATED COUNT: 12.4 % (ref 11.5–17)
GLOBULIN SER-MCNC: 4.5 GM/DL (ref 2.4–3.5)
GLUCOSE SERPL-MCNC: 137 MG/DL (ref 74–100)
HCT VFR BLD AUTO: 47 % (ref 42–52)
HGB BLD-MCNC: 16.1 G/DL (ref 14–18)
IMM GRANULOCYTES # BLD AUTO: 0.02 X10(3)/MCL (ref 0–0.04)
IMM GRANULOCYTES NFR BLD AUTO: 0.4 %
LDH SERPL-CCNC: 133 U/L (ref 125–220)
LYMPHOCYTES # BLD AUTO: 1.11 X10(3)/MCL (ref 0.6–4.6)
LYMPHOCYTES NFR BLD AUTO: 24.2 %
MCH RBC QN AUTO: 29.1 PG (ref 27–31)
MCHC RBC AUTO-ENTMCNC: 34.3 G/DL (ref 33–36)
MCV RBC AUTO: 85 FL (ref 80–94)
MONOCYTES # BLD AUTO: 0.55 X10(3)/MCL (ref 0.1–1.3)
MONOCYTES NFR BLD AUTO: 12 %
NEUTROPHILS # BLD AUTO: 2.77 X10(3)/MCL (ref 2.1–9.2)
NEUTROPHILS NFR BLD AUTO: 60.6 %
PLATELET # BLD AUTO: 70 X10(3)/MCL (ref 130–400)
PMV BLD AUTO: 10.8 FL (ref 7.4–10.4)
POTASSIUM SERPL-SCNC: 3.4 MMOL/L (ref 3.5–5.1)
PROT SERPL-MCNC: 8.3 GM/DL (ref 6.4–8.3)
RBC # BLD AUTO: 5.53 X10(6)/MCL (ref 4.7–6.1)
SODIUM SERPL-SCNC: 135 MMOL/L (ref 136–145)
WBC # SPEC AUTO: 4.58 X10(3)/MCL (ref 4.5–11.5)

## 2024-05-07 PROCEDURE — 36415 COLL VENOUS BLD VENIPUNCTURE: CPT

## 2024-05-07 PROCEDURE — 82550 ASSAY OF CK (CPK): CPT

## 2024-05-07 PROCEDURE — 80053 COMPREHEN METABOLIC PANEL: CPT

## 2024-05-07 PROCEDURE — 82248 BILIRUBIN DIRECT: CPT

## 2024-05-07 PROCEDURE — 85025 COMPLETE CBC W/AUTO DIFF WBC: CPT

## 2024-05-07 PROCEDURE — 82306 VITAMIN D 25 HYDROXY: CPT

## 2024-05-07 PROCEDURE — 83615 LACTATE (LD) (LDH) ENZYME: CPT

## 2024-05-13 ENCOUNTER — LAB VISIT (OUTPATIENT)
Dept: LAB | Facility: HOSPITAL | Age: 49
End: 2024-05-13
Attending: NURSE PRACTITIONER
Payer: COMMERCIAL

## 2024-05-13 DIAGNOSIS — D69.3 ACUTE IDIOPATHIC THROMBOCYTOPENIC PURPURA: ICD-10-CM

## 2024-05-13 DIAGNOSIS — E03.9 HYPOTHYROIDISM, UNSPECIFIED TYPE: Primary | ICD-10-CM

## 2024-05-13 LAB
BASOPHILS # BLD AUTO: 0.03 X10(3)/MCL
BASOPHILS NFR BLD AUTO: 0.6 %
EOSINOPHIL # BLD AUTO: 0.16 X10(3)/MCL (ref 0–0.9)
EOSINOPHIL NFR BLD AUTO: 3.1 %
ERYTHROCYTE [DISTWIDTH] IN BLOOD BY AUTOMATED COUNT: 12.4 % (ref 11.5–17)
HCT VFR BLD AUTO: 44.3 % (ref 42–52)
HGB BLD-MCNC: 15 G/DL (ref 14–18)
IMM GRANULOCYTES # BLD AUTO: 0.02 X10(3)/MCL (ref 0–0.04)
IMM GRANULOCYTES NFR BLD AUTO: 0.4 %
LYMPHOCYTES # BLD AUTO: 1.55 X10(3)/MCL (ref 0.6–4.6)
LYMPHOCYTES NFR BLD AUTO: 30.3 %
MCH RBC QN AUTO: 29.2 PG (ref 27–31)
MCHC RBC AUTO-ENTMCNC: 33.9 G/DL (ref 33–36)
MCV RBC AUTO: 86.2 FL (ref 80–94)
MONOCYTES # BLD AUTO: 0.69 X10(3)/MCL (ref 0.1–1.3)
MONOCYTES NFR BLD AUTO: 13.5 %
NEUTROPHILS # BLD AUTO: 2.67 X10(3)/MCL (ref 2.1–9.2)
NEUTROPHILS NFR BLD AUTO: 52.1 %
PLATELET # BLD AUTO: 86 X10(3)/MCL (ref 130–400)
PMV BLD AUTO: 10.2 FL (ref 7.4–10.4)
RBC # BLD AUTO: 5.14 X10(6)/MCL (ref 4.7–6.1)
T4 FREE SERPL-MCNC: 1 NG/DL (ref 0.7–1.48)
TSH SERPL-ACNC: 4.59 UIU/ML (ref 0.35–4.94)
WBC # SPEC AUTO: 5.12 X10(3)/MCL (ref 4.5–11.5)

## 2024-05-13 PROCEDURE — 84439 ASSAY OF FREE THYROXINE: CPT

## 2024-05-13 PROCEDURE — 85025 COMPLETE CBC W/AUTO DIFF WBC: CPT

## 2024-05-13 PROCEDURE — 84443 ASSAY THYROID STIM HORMONE: CPT

## 2024-05-13 PROCEDURE — 36415 COLL VENOUS BLD VENIPUNCTURE: CPT

## 2024-05-13 NOTE — PROGRESS NOTES
History & Physical    CHIEF COMPLAINT:  LYMPHADENOPATHY    History of Present Illness:  48 year-old-male referred by Dr. Kuhn.  Patient is being followed by her for ITP.  He has currently had complaints of weakness, dizziness and weight loss.    CT chest/abd/pel with contrast done in April showed nonspecific lymphadenopathy seen in the axillary regions bilaterally.  Dr. Kuhn has requested an axillary lymph node excisional biopsy.    During our discussion the patient states that he can not scheduled surgery for some time due to work constraints.  Therefore we discussed core needle biopsy here in the office      Review of patient's allergies indicates:  No Known Allergies    Current Outpatient Medications   Medication Sig Dispense Refill    eltrombopag olamine (PROMACTA) 50 MG Tab Take 1 tablet (50 mg total) by mouth once daily. (Patient not taking: Reported on 5/6/2024) 30 tablet 5    LEVSIN 0.125 mg Tab Take 125 mcg by mouth.       No current facility-administered medications for this visit.       No past medical history on file.  No past surgical history on file.  No family history on file.  Social History     Tobacco Use    Smoking status: Never    Smokeless tobacco: Never   Substance Use Topics    Alcohol use: Yes     Comment: occasionally    Drug use: Never        Review of Systems:  Review of Systems       Objective     Vital Signs (Most Recent)              Physical Exam:  Physical Exam  Constitutional:       General: He is not in acute distress.     Appearance: Normal appearance. He is well-developed and normal weight. He is not ill-appearing, toxic-appearing or diaphoretic.   HENT:      Head: Normocephalic and atraumatic.      Right Ear: Hearing and external ear normal.      Left Ear: Hearing and external ear normal.      Nose: No congestion or rhinorrhea.      Mouth/Throat:      Mouth: Mucous membranes are moist.      Pharynx: Oropharynx is clear. No oropharyngeal exudate.   Eyes:      General: Lids are  normal. Gaze aligned appropriately. No scleral icterus.     Extraocular Movements: Extraocular movements intact.      Right eye: Normal extraocular motion and no nystagmus.      Left eye: Normal extraocular motion and no nystagmus.      Conjunctiva/sclera: Conjunctivae normal.      Right eye: Right conjunctiva is not injected.      Left eye: Left conjunctiva is not injected.      Pupils: Pupils are equal, round, and reactive to light.   Neck:      Vascular: No carotid bruit or JVD.      Trachea: Trachea and phonation normal.   Cardiovascular:      Rate and Rhythm: Normal rate and regular rhythm.      Pulses: Normal pulses.      Heart sounds: Normal heart sounds. No murmur heard.     No friction rub. No gallop.   Pulmonary:      Effort: Pulmonary effort is normal. No tachypnea, accessory muscle usage, respiratory distress or retractions.      Breath sounds: Normal breath sounds and air entry. No stridor or decreased air movement. No wheezing or rhonchi.   Chest:      Chest wall: No mass, deformity, swelling, tenderness or crepitus.   Abdominal:      General: Abdomen is flat. Bowel sounds are normal. There is no distension. There are no signs of injury.      Palpations: Abdomen is soft. There is no shifting dullness, fluid wave, hepatomegaly, splenomegaly or mass.      Tenderness: There is no abdominal tenderness. There is no guarding or rebound.      Hernia: No hernia is present.   Musculoskeletal:         General: No swelling or tenderness.      Cervical back: Normal range of motion and neck supple. No rigidity, tenderness or crepitus.   Lymphadenopathy:      Head:      Right side of head: No submental, submandibular or occipital adenopathy.      Left side of head: No submental, submandibular or occipital adenopathy.      Cervical: No cervical adenopathy.      Right cervical: No superficial or posterior cervical adenopathy.     Left cervical: No superficial or posterior cervical adenopathy.      Upper Body:       Right upper body: No supraclavicular or axillary adenopathy.      Left upper body: No supraclavicular or axillary adenopathy.      Lower Body: No right inguinal adenopathy. No left inguinal adenopathy.   Skin:     General: Skin is warm.      Capillary Refill: Capillary refill takes less than 2 seconds.      Coloration: Skin is not cyanotic, jaundiced, mottled or pale.      Findings: No bruising, ecchymosis, erythema, lesion, petechiae or rash.      Nails: There is no clubbing.   Neurological:      General: No focal deficit present.      Mental Status: He is alert and oriented to person, place, and time.      Cranial Nerves: No cranial nerve deficit or facial asymmetry.      Sensory: No sensory deficit.      Motor: No weakness, tremor, atrophy or abnormal muscle tone.      Coordination: Coordination normal.      Gait: Gait normal.      Deep Tendon Reflexes: Reflexes normal.   Psychiatric:         Attention and Perception: Attention and perception normal.         Mood and Affect: Mood normal. Mood is not anxious or depressed. Affect is not blunt or flat.         Speech: Speech normal. Speech is not slurred.         Behavior: Behavior normal. Behavior is cooperative.       Office procedure:  Under sterile conditions ultrasound-guided core needle biopsy was obtained of an enlarged right axillary lymph node.  Multiple cores were obtained and sent fresh for histopathology, flow cytometry, AFB and fungal stains.     Assessment and Plan   Bilateral axillary adenopathy    Core needle biopsy obtained in the office, await pathology

## 2024-05-14 ENCOUNTER — OFFICE VISIT (OUTPATIENT)
Dept: SURGICAL ONCOLOGY | Facility: CLINIC | Age: 49
End: 2024-05-14
Payer: COMMERCIAL

## 2024-05-14 VITALS
BODY MASS INDEX: 30.08 KG/M2 | HEIGHT: 67 IN | HEART RATE: 63 BPM | SYSTOLIC BLOOD PRESSURE: 128 MMHG | DIASTOLIC BLOOD PRESSURE: 86 MMHG | WEIGHT: 191.63 LBS

## 2024-05-14 DIAGNOSIS — R59.0 AXILLARY ADENOPATHY: ICD-10-CM

## 2024-05-14 DIAGNOSIS — R13.14 DYSPHAGIA, PHARYNGOESOPHAGEAL PHASE: Primary | ICD-10-CM

## 2024-05-14 DIAGNOSIS — D69.3 IDIOPATHIC THROMBOCYTOPENIC PURPURA (ITP): ICD-10-CM

## 2024-05-14 PROCEDURE — 3079F DIAST BP 80-89 MM HG: CPT | Mod: CPTII,S$GLB,, | Performed by: SURGERY

## 2024-05-14 PROCEDURE — 99203 OFFICE O/P NEW LOW 30 MIN: CPT | Mod: S$GLB,,, | Performed by: SURGERY

## 2024-05-14 PROCEDURE — 99999 PR PBB SHADOW E&M-EST. PATIENT-LVL III: CPT | Mod: PBBFAC,,, | Performed by: SURGERY

## 2024-05-14 PROCEDURE — 1111F DSCHRG MED/CURRENT MED MERGE: CPT | Mod: CPTII,S$GLB,, | Performed by: SURGERY

## 2024-05-14 PROCEDURE — 3008F BODY MASS INDEX DOCD: CPT | Mod: CPTII,S$GLB,, | Performed by: SURGERY

## 2024-05-14 PROCEDURE — 1159F MED LIST DOCD IN RCRD: CPT | Mod: CPTII,S$GLB,, | Performed by: SURGERY

## 2024-05-14 PROCEDURE — 3074F SYST BP LT 130 MM HG: CPT | Mod: CPTII,S$GLB,, | Performed by: SURGERY

## 2024-05-14 RX ORDER — PROPRANOLOL HYDROCHLORIDE 40 MG/1
20 TABLET ORAL
COMMUNITY
Start: 2024-04-30

## 2024-05-14 RX ORDER — FLUTICASONE PROPIONATE 50 MCG
SPRAY, SUSPENSION (ML) NASAL
COMMUNITY

## 2024-05-14 RX ORDER — FAMOTIDINE 20 MG/1
20 TABLET, FILM COATED ORAL NIGHTLY
COMMUNITY
Start: 2024-05-08

## 2024-05-14 RX ORDER — CLOTRIMAZOLE 10 MG/1
10 LOZENGE ORAL; TOPICAL 3 TIMES DAILY
COMMUNITY
Start: 2024-05-09

## 2024-05-14 RX ORDER — FLUOXETINE HYDROCHLORIDE 20 MG/1
CAPSULE ORAL
COMMUNITY
Start: 2024-04-30

## 2024-05-14 RX ORDER — CYCLOBENZAPRINE HCL 5 MG
5 TABLET ORAL NIGHTLY
COMMUNITY
Start: 2024-05-08

## 2024-05-15 ENCOUNTER — LAB REQUISITION (OUTPATIENT)
Dept: LAB | Facility: HOSPITAL | Age: 49
End: 2024-05-15
Payer: COMMERCIAL

## 2024-05-15 DIAGNOSIS — R59.9 ENLARGED LYMPH NODES, UNSPECIFIED: ICD-10-CM

## 2024-05-15 PROCEDURE — 88185 FLOWCYTOMETRY/TC ADD-ON: CPT

## 2024-05-15 PROCEDURE — 88184 FLOWCYTOMETRY/ TC 1 MARKER: CPT | Performed by: PATHOLOGY

## 2024-05-17 LAB — MAYO GENERIC ORDERABLE RESULT: NORMAL

## 2024-05-20 ENCOUNTER — HOSPITAL ENCOUNTER (OUTPATIENT)
Dept: RADIOLOGY | Facility: HOSPITAL | Age: 49
Discharge: HOME | End: 2024-05-20
Attending: OTOLARYNGOLOGY
Payer: COMMERCIAL

## 2024-05-20 DIAGNOSIS — R13.14 DYSPHAGIA, PHARYNGOESOPHAGEAL PHASE: ICD-10-CM

## 2024-05-20 PROCEDURE — 74220 X-RAY XM ESOPHAGUS 1CNTRST: CPT | Mod: TC

## 2024-05-20 PROCEDURE — 25500020 PHARM REV CODE 255: Performed by: OTOLARYNGOLOGY

## 2024-05-20 PROCEDURE — A9698 NON-RAD CONTRAST MATERIALNOC: HCPCS | Performed by: OTOLARYNGOLOGY

## 2024-05-20 RX ADMIN — BARIUM SULFATE 135 ML: 980 POWDER, FOR SUSPENSION ORAL at 08:05

## 2024-05-23 NOTE — PROGRESS NOTES
Subjective:       Patient ID: Darian Morales is a 48 y.o. male.    ITP--Diagnosed 24    Work-up:  24--peripheral smear with severe thrombocytopenia, occasional giant forms, no schistocytes or clumps; vitamin B12 371, folate 9.2, normal PT and PTT, fibrinogen normal, GAYLE negative, hepatitis panel and HIV negative, Platelet IgM strongly positive.  S/p Right axillary LN core needle biopsy on 24--No monotypic B-cell population, phenotypically abnormal T-cell population or blast cell population detected.     Imagin. CT C/A/P 24--Nonspecific lymphadenopathy seen in the axillary regions bilaterally, mild hepatomegaly and some hepatic steatosis.  2. CT brain w/o contrast 24--No acute intracranial abnormality identified.   3. CTA chest done at [facility] 24--enlarged right axillary nodes, with largest measuring 3cm, left axilla is unremarkable.  4. CT soft tissue neck w/ and w/o contrast done 5/3/24--No convincing acute abnormality.  Focal right parapharyngeal calcification is nonspecific, differential includes sialolithiasis versus sequela of chronic tonsillar inflammation.  5. US abdomen/liver done 5/3/24--Hyperechoic appearance of the liver is suggestive of steatosis, otherwise, unremarkable sonographic evaluation.  6. Esophagram on 24--Small hiatus hernia with Schatzki ring and to and fro motion of contrast within the esophagus     Treatment history:  Platelet transfusion 24.  Decadron 40mg IV X 4 days completed 24 + IVIG X 2 days completed 24.    Current treatment plan:   Observation  Start Promacta if platelets drop <30K.    Chief Complaint: Other Misc (Pt reports no concerns today.)    HPI  Patient presents for follow-up of ITP. He is doing very well. His wife is with him today. His PCP did a CT due to throat pain and it showed a possible stone in parapharyngeal area and he has been referred to ENT. He was seen by ENT earlier today and told he has a tonsil stone.  No plans for treatment or removal at this time. CT of C/A/P in April revealed nonspecific lymphadenopathy seen in the axillary regions. He was referred to Dr. Sonu Lozano for excisional biopsy. However, the patient could not take off of work so a core needle was obtained in the office on 5/14/24. Pathology showed no monotypic B-cell population, phenotypically abnormal T-cell population or blast cell population detected. Follow-up US done yesterday revealed prominent partially fatty lymph node. He has been referred to GI for the swallowing difficulty and is awaiting an appointment. Otherwise doing well.     History reviewed. No pertinent past medical history.   History reviewed. No pertinent surgical history.  Family History   Problem Relation Name Age of Onset    Diabetes Father Jimmy Morales     Heart disease Father Jimmy Morales     Diabetes Paternal Grandmother Mery Morales      Social History     Socioeconomic History    Marital status:    Tobacco Use    Smoking status: Never    Smokeless tobacco: Never   Substance and Sexual Activity    Alcohol use: Not Currently     Comment: occasionally    Drug use: Never    Sexual activity: Yes     Partners: Female     Birth control/protection: None     Social Determinants of Health     Financial Resource Strain: Low Risk  (4/20/2024)    Overall Financial Resource Strain (CARDIA)     Difficulty of Paying Living Expenses: Not very hard   Food Insecurity: No Food Insecurity (4/20/2024)    Hunger Vital Sign     Worried About Running Out of Food in the Last Year: Never true     Ran Out of Food in the Last Year: Never true   Transportation Needs: No Transportation Needs (4/20/2024)    PRAPARE - Transportation     Lack of Transportation (Medical): No     Lack of Transportation (Non-Medical): No   Stress: No Stress Concern Present (4/20/2024)    [employer] [place] [employer] - Occupational Stress Questionnaire     Feeling of Stress : Not at all   Housing  "Stability: Low Risk  (4/20/2024)    Housing Stability Vital Sign     Unable to Pay for Housing in the Last Year: No     Homeless in the Last Year: No       Review of patient's allergies indicates:  No Known Allergies   Current Outpatient Medications on File Prior to Visit   Medication Sig Dispense Refill    cyclobenzaprine (FLEXERIL) 5 MG tablet Take 5 mg by mouth every evening.      ergocalciferol (ERGOCALCIFEROL) 50,000 unit Cap Take 50,000 Units by mouth every 7 days.      famotidine (PEPCID) 20 MG tablet Take 20 mg by mouth every evening.      FLUoxetine 20 MG capsule       fluticasone propionate (FLONASE) 50 mcg/actuation nasal spray Spray 2 sprays every day by intranasal route.      LEVSIN 0.125 mg Tab Take 125 mcg by mouth.      propranoloL (INDERAL) 40 MG tablet Take 20 mg by mouth as needed.      clotrimazole (MYCELEX) 10 mg christian Take 10 mg by mouth 3 (three) times daily. (Patient not taking: Reported on 5/29/2024)      eltrombopag olamine (PROMACTA) 50 MG Tab Take 1 tablet (50 mg total) by mouth once daily. (Patient not taking: Reported on 5/14/2024) 30 tablet 5     No current facility-administered medications on file prior to visit.      Review of Systems   Constitutional:  Negative for appetite change and unexpected weight change.   HENT:  Negative for mouth sores.    Respiratory:  Negative for cough and shortness of breath.    Cardiovascular:  Negative for chest pain.   Gastrointestinal:  Negative for abdominal pain and diarrhea.   Genitourinary:  Negative for frequency.   Musculoskeletal:  Negative for back pain.   Integumentary:  Negative for rash.   Neurological:  Negative for headaches.   Hematological:  Negative for adenopathy.   Psychiatric/Behavioral:  The patient is nervous/anxious.          Vitals:    05/29/24 1456   BP: 126/85   Pulse: 70   Resp: 14   Temp: 98.2 °F (36.8 °C)   TempSrc: Oral   SpO2: 96%   Weight: 86.4 kg (190 lb 8 oz)   Height: 5' 7" (1.702 m)     Wt Readings from Last 3 " Encounters:   05/29/24 1456 86.4 kg (190 lb 8 oz)   05/14/24 1319 86.9 kg (191 lb 9.6 oz)   05/06/24 1049 87 kg (191 lb 12.8 oz)     Physical Exam  Constitutional:       General: He is awake.      Appearance: Normal appearance. He is normal weight.   HENT:      Head: Normocephalic and atraumatic.      Nose: Nose normal.      Mouth/Throat:      Mouth: Mucous membranes are moist.   Eyes:      General: Vision grossly intact.      Extraocular Movements: Extraocular movements intact.      Conjunctiva/sclera: Conjunctivae normal.   Cardiovascular:      Rate and Rhythm: Normal rate and regular rhythm.      Heart sounds: Normal heart sounds.   Pulmonary:      Effort: Pulmonary effort is normal.      Breath sounds: Normal breath sounds.   Abdominal:      General: Abdomen is protuberant. Bowel sounds are normal. There is no distension.      Palpations: Abdomen is soft.      Tenderness: There is no abdominal tenderness.   Musculoskeletal:      Cervical back: Normal range of motion and neck supple.      Right lower leg: No edema.      Left lower leg: No edema.   Lymphadenopathy:      Cervical: No cervical adenopathy.      Upper Body:      Right upper body: No supraclavicular adenopathy.      Left upper body: No supraclavicular adenopathy.   Skin:     General: Skin is warm and dry.   Neurological:      Mental Status: He is alert and oriented to person, place, and time.      Motor: Motor function is intact.   Psychiatric:         Mood and Affect: Mood normal.         Speech: Speech normal.         Behavior: Behavior is cooperative.         Judgment: Judgment normal.       Lab Visit on 05/29/2024   Component Date Value Ref Range Status    WBC 05/29/2024 4.56  4.50 - 11.50 x10(3)/mcL Final    RBC 05/29/2024 5.10  4.70 - 6.10 x10(6)/mcL Final    Hgb 05/29/2024 15.2  14.0 - 18.0 g/dL Final    Hct 05/29/2024 43.6  42.0 - 52.0 % Final    MCV 05/29/2024 85.5  80.0 - 94.0 fL Final    MCH 05/29/2024 29.8  27.0 - 31.0 pg Final    MCHC  05/29/2024 34.9  33.0 - 36.0 g/dL Final    RDW 05/29/2024 12.1  11.5 - 17.0 % Final    Platelet 05/29/2024 55 (L)  130 - 400 x10(3)/mcL Final    MPV 05/29/2024 10.0  7.4 - 10.4 fL Final    Neut % 05/29/2024 51.8  % Final    Lymph % 05/29/2024 34.6  % Final    Mono % 05/29/2024 10.7  % Final    Eos % 05/29/2024 1.8  % Final    Basophil % 05/29/2024 0.9  % Final    Lymph # 05/29/2024 1.58  0.6 - 4.6 x10(3)/mcL Final    Neut # 05/29/2024 2.36  2.1 - 9.2 x10(3)/mcL Final    Mono # 05/29/2024 0.49  0.1 - 1.3 x10(3)/mcL Final    Eos # 05/29/2024 0.08  0 - 0.9 x10(3)/mcL Final    Baso # 05/29/2024 0.04  <=0.2 x10(3)/mcL Final    IG# 05/29/2024 0.01  0 - 0.04 x10(3)/mcL Final    IG% 05/29/2024 0.2  % Final         Assessment:       1. Idiopathic thrombocytopenic purpura (ITP)        Plan:       Patient with ITP, diagnosed 4/2024.  S/p IVIG X 2 days and 4 days of IV decadron 40mg completed 4/21/24.     CBC today with platelets 55,000. CBC is otherwise good.   Plan to start Promacta if platelets drop <30K.   Will have him repeat his CBC in 2 weeks at [facility].   Follow-up in 4 weeks with CBC same day.   Promacta has already been ordered and insurance approved.    Referred to ENT for possible salivary gland stone. He was seen by ENT today and was told it is a tonsil stone. No plans for treatment or removal at this time.   He has had some trouble eating/swallowing and has been referred to GI. Esophagram on 5/20/24--Small hiatus hernia with Schatzki ring and to and fro motion of contrast within the esophagus     CT C/A/P from 4/19/24 showed non-specific axillary adenopathy, but otherwise normal.   He had another CTA chest at [facility] on 4/27/24 when he went back to ED for chest pain. They mentioned again the right axillary enlarged nodes, measuring up to 3cm.  He was referred to Dr. Sonu Lozano for excisional biopsy. However, the patient could not take off of work so a core needle was obtained in the office on 5/14/24. Pathology showed  no monotypic B-cell population, phenotypically abnormal T-cell population or blast cell population detected.   Follow-up US of the right axilla on 5/28/24 revealed the 3 cm ovoid hypoechoic focus with central increased echogenicity suspicious for a prominent partially fatty lymph node.   Scheduled to follow-up with his PCP next week.     All questions answered at this time.      BHAVANI Liang

## 2024-05-28 ENCOUNTER — HOSPITAL ENCOUNTER (OUTPATIENT)
Dept: RADIOLOGY | Facility: HOSPITAL | Age: 49
Discharge: HOME | End: 2024-05-28
Attending: FAMILY MEDICINE
Payer: COMMERCIAL

## 2024-05-28 DIAGNOSIS — R59.0 LOCALIZED ENLARGED LYMPH NODES: ICD-10-CM

## 2024-05-28 PROCEDURE — 76882 US LMTD JT/FCL EVL NVASC XTR: CPT | Mod: TC,RT

## 2024-05-29 ENCOUNTER — OFFICE VISIT (OUTPATIENT)
Dept: HEMATOLOGY/ONCOLOGY | Facility: CLINIC | Age: 49
End: 2024-05-29
Payer: COMMERCIAL

## 2024-05-29 ENCOUNTER — LAB VISIT (OUTPATIENT)
Dept: LAB | Facility: HOSPITAL | Age: 49
End: 2024-05-29
Attending: INTERNAL MEDICINE
Payer: COMMERCIAL

## 2024-05-29 VITALS
WEIGHT: 190.5 LBS | OXYGEN SATURATION: 96 % | TEMPERATURE: 98 F | DIASTOLIC BLOOD PRESSURE: 85 MMHG | HEIGHT: 67 IN | BODY MASS INDEX: 29.9 KG/M2 | HEART RATE: 70 BPM | SYSTOLIC BLOOD PRESSURE: 126 MMHG | RESPIRATION RATE: 14 BRPM

## 2024-05-29 DIAGNOSIS — D69.3 ACUTE IDIOPATHIC THROMBOCYTOPENIC PURPURA: ICD-10-CM

## 2024-05-29 DIAGNOSIS — D69.3 IDIOPATHIC THROMBOCYTOPENIC PURPURA (ITP): Primary | ICD-10-CM

## 2024-05-29 LAB
BASOPHILS # BLD AUTO: 0.04 X10(3)/MCL
BASOPHILS NFR BLD AUTO: 0.9 %
EOSINOPHIL # BLD AUTO: 0.08 X10(3)/MCL (ref 0–0.9)
EOSINOPHIL NFR BLD AUTO: 1.8 %
ERYTHROCYTE [DISTWIDTH] IN BLOOD BY AUTOMATED COUNT: 12.1 % (ref 11.5–17)
HCT VFR BLD AUTO: 43.6 % (ref 42–52)
HGB BLD-MCNC: 15.2 G/DL (ref 14–18)
IMM GRANULOCYTES # BLD AUTO: 0.01 X10(3)/MCL (ref 0–0.04)
IMM GRANULOCYTES NFR BLD AUTO: 0.2 %
LYMPHOCYTES # BLD AUTO: 1.58 X10(3)/MCL (ref 0.6–4.6)
LYMPHOCYTES NFR BLD AUTO: 34.6 %
MCH RBC QN AUTO: 29.8 PG (ref 27–31)
MCHC RBC AUTO-ENTMCNC: 34.9 G/DL (ref 33–36)
MCV RBC AUTO: 85.5 FL (ref 80–94)
MONOCYTES # BLD AUTO: 0.49 X10(3)/MCL (ref 0.1–1.3)
MONOCYTES NFR BLD AUTO: 10.7 %
NEUTROPHILS # BLD AUTO: 2.36 X10(3)/MCL (ref 2.1–9.2)
NEUTROPHILS NFR BLD AUTO: 51.8 %
PLATELET # BLD AUTO: 55 X10(3)/MCL (ref 130–400)
PMV BLD AUTO: 10 FL (ref 7.4–10.4)
RBC # BLD AUTO: 5.1 X10(6)/MCL (ref 4.7–6.1)
WBC # SPEC AUTO: 4.56 X10(3)/MCL (ref 4.5–11.5)

## 2024-05-29 PROCEDURE — 3008F BODY MASS INDEX DOCD: CPT | Mod: CPTII,S$GLB,, | Performed by: NURSE PRACTITIONER

## 2024-05-29 PROCEDURE — 36415 COLL VENOUS BLD VENIPUNCTURE: CPT

## 2024-05-29 PROCEDURE — 1160F RVW MEDS BY RX/DR IN RCRD: CPT | Mod: CPTII,S$GLB,, | Performed by: NURSE PRACTITIONER

## 2024-05-29 PROCEDURE — 3074F SYST BP LT 130 MM HG: CPT | Mod: CPTII,S$GLB,, | Performed by: NURSE PRACTITIONER

## 2024-05-29 PROCEDURE — 99214 OFFICE O/P EST MOD 30 MIN: CPT | Mod: S$GLB,,, | Performed by: NURSE PRACTITIONER

## 2024-05-29 PROCEDURE — 1159F MED LIST DOCD IN RCRD: CPT | Mod: CPTII,S$GLB,, | Performed by: NURSE PRACTITIONER

## 2024-05-29 PROCEDURE — 85025 COMPLETE CBC W/AUTO DIFF WBC: CPT

## 2024-05-29 PROCEDURE — 99999 PR PBB SHADOW E&M-EST. PATIENT-LVL IV: CPT | Mod: PBBFAC,,, | Performed by: NURSE PRACTITIONER

## 2024-05-29 PROCEDURE — 3079F DIAST BP 80-89 MM HG: CPT | Mod: CPTII,S$GLB,, | Performed by: NURSE PRACTITIONER

## 2024-05-29 RX ORDER — ERGOCALCIFEROL 1.25 MG/1
50000 CAPSULE ORAL
COMMUNITY
Start: 2024-05-08

## 2024-06-18 NOTE — PROGRESS NOTES
Subjective:       Patient ID: Darian Morales is a 49 y.o. male.    ITP--Diagnosed 24    Work-up:  24--peripheral smear with severe thrombocytopenia, occasional giant forms, no schistocytes or clumps; vitamin B12 371, folate 9.2, normal PT and PTT, fibrinogen normal, GAYLE negative, hepatitis panel and HIV negative, Platelet IgM strongly positive.  S/p Right axillary LN core needle biopsy on 24--No monotypic B-cell population, phenotypically abnormal T-cell population or blast cell population detected.     Imagin. CT C/A/P 24--Nonspecific lymphadenopathy seen in the axillary regions bilaterally, mild hepatomegaly and some hepatic steatosis.  2. CT brain w/o contrast 24--No acute intracranial abnormality identified.   3. CTA chest done at [facility] 24--enlarged right axillary nodes, with largest measuring 3cm, left axilla is unremarkable.  4. CT soft tissue neck w/ and w/o contrast done 5/3/24--No convincing acute abnormality.  Focal right parapharyngeal calcification is nonspecific, differential includes sialolithiasis versus sequela of chronic tonsillar inflammation.  5. US abdomen/liver done 5/3/24--Hyperechoic appearance of the liver is suggestive of steatosis, otherwise, unremarkable sonographic evaluation.  6. Esophagram on 24--Small hiatus hernia with Schatzki ring and to and fro motion of contrast within the esophagus.    Platelets:  24--70  24--86  24--55  24--83  24--54    Treatment history:  Platelet transfusion 24.  Decadron 40mg IV X 4 days completed 24 + IVIG X 2 days completed 24.    Current treatment plan:   Observation  Start Promacta if platelets drop <30K.    Chief Complaint: Other Misc (Pt reports no concerns today.)    HPI  Patient presents for follow-up of ITP. Platelets are 54K today. He is doing good. Denies any complaints. No bleeding/bruising. He is feeling much better than he was before. He is almost back to his  normal self.     History reviewed. No pertinent past medical history.   History reviewed. No pertinent surgical history.  Family History   Problem Relation Name Age of Onset    Diabetes Father Jimmy Morales     Heart disease Father Jimmy Morales     Diabetes Paternal Grandmother Mery Morales      Social History     Socioeconomic History    Marital status:    Tobacco Use    Smoking status: Never    Smokeless tobacco: Never   Substance and Sexual Activity    Alcohol use: Not Currently     Comment: occasionally    Drug use: Never    Sexual activity: Yes     Partners: Female     Birth control/protection: None     Social Determinants of Health     Financial Resource Strain: Low Risk  (4/20/2024)    Overall Financial Resource Strain (CARDIA)     Difficulty of Paying Living Expenses: Not very hard   Food Insecurity: No Food Insecurity (4/20/2024)    Hunger Vital Sign     Worried About Running Out of Food in the Last Year: Never true     Ran Out of Food in the Last Year: Never true   Transportation Needs: No Transportation Needs (4/20/2024)    PRAPARE - Transportation     Lack of Transportation (Medical): No     Lack of Transportation (Non-Medical): No   Stress: No Stress Concern Present (4/20/2024)    [employer] [place] [employer] - Occupational Stress Questionnaire     Feeling of Stress : Not at all   Housing Stability: Low Risk  (4/20/2024)    Housing Stability Vital Sign     Unable to Pay for Housing in the Last Year: No     Homeless in the Last Year: No       Review of patient's allergies indicates:  No Known Allergies   Current Outpatient Medications on File Prior to Visit   Medication Sig Dispense Refill    ergocalciferol (ERGOCALCIFEROL) 50,000 unit Cap Take 50,000 Units by mouth every 7 days.      famotidine (PEPCID) 20 MG tablet Take 20 mg by mouth every evening.      FLUoxetine 20 MG capsule       fluticasone propionate (FLONASE) 50 mcg/actuation nasal spray Spray 2 sprays every day by  "intranasal route.      LEVSIN 0.125 mg Tab Take 125 mcg by mouth.      propranoloL (INDERAL) 40 MG tablet Take 20 mg by mouth as needed.      clotrimazole (MYCELEX) 10 mg christian Take 10 mg by mouth 3 (three) times daily. (Patient not taking: Reported on 5/29/2024)      cyclobenzaprine (FLEXERIL) 5 MG tablet Take 5 mg by mouth every evening. (Patient not taking: Reported on 6/26/2024)      eltrombopag olamine (PROMACTA) 50 MG Tab Take 1 tablet (50 mg total) by mouth once daily. (Patient not taking: Reported on 5/14/2024) 30 tablet 5     No current facility-administered medications on file prior to visit.      Review of Systems   Constitutional:  Negative for appetite change and unexpected weight change.   HENT:  Negative for mouth sores.    Respiratory:  Negative for cough and shortness of breath.    Cardiovascular:  Negative for chest pain.   Gastrointestinal:  Negative for abdominal pain and diarrhea.   Genitourinary:  Negative for frequency.   Musculoskeletal:  Negative for back pain.   Integumentary:  Negative for rash.   Neurological:  Negative for headaches.   Hematological:  Negative for adenopathy.   Psychiatric/Behavioral:  The patient is nervous/anxious.          Vitals:    06/26/24 1249   BP: 121/78   Pulse: 64   Resp: 14   Temp: 98.3 °F (36.8 °C)   TempSrc: Oral   SpO2: 98%   Weight: 86.8 kg (191 lb 4.8 oz)   Height: 5' 7" (1.702 m)       Wt Readings from Last 3 Encounters:   06/26/24 1249 86.8 kg (191 lb 4.8 oz)   05/29/24 1456 86.4 kg (190 lb 8 oz)   05/14/24 1319 86.9 kg (191 lb 9.6 oz)     Physical Exam  Constitutional:       General: He is awake.      Appearance: Normal appearance. He is normal weight.   HENT:      Head: Normocephalic and atraumatic.      Nose: Nose normal.      Mouth/Throat:      Mouth: Mucous membranes are moist.   Eyes:      General: Vision grossly intact.      Extraocular Movements: Extraocular movements intact.      Conjunctiva/sclera: Conjunctivae normal.   Cardiovascular:      " Rate and Rhythm: Normal rate and regular rhythm.      Heart sounds: Normal heart sounds.   Pulmonary:      Effort: Pulmonary effort is normal.      Breath sounds: Normal breath sounds.   Abdominal:      General: Abdomen is protuberant. Bowel sounds are normal. There is no distension.      Palpations: Abdomen is soft.      Tenderness: There is no abdominal tenderness.   Musculoskeletal:      Cervical back: Normal range of motion and neck supple.      Right lower leg: No edema.      Left lower leg: No edema.   Lymphadenopathy:      Cervical: No cervical adenopathy.      Upper Body:      Right upper body: No supraclavicular adenopathy.      Left upper body: No supraclavicular adenopathy.   Skin:     General: Skin is warm and dry.   Neurological:      Mental Status: He is alert and oriented to person, place, and time.      Motor: Motor function is intact.   Psychiatric:         Mood and Affect: Mood normal.         Speech: Speech normal.         Behavior: Behavior is cooperative.         Judgment: Judgment normal.       Lab Visit on 06/26/2024   Component Date Value Ref Range Status    WBC 06/26/2024 4.71  4.50 - 11.50 x10(3)/mcL Final    RBC 06/26/2024 4.88  4.70 - 6.10 x10(6)/mcL Final    Hgb 06/26/2024 14.5  14.0 - 18.0 g/dL Final    Hct 06/26/2024 41.9 (L)  42.0 - 52.0 % Final    MCV 06/26/2024 85.9  80.0 - 94.0 fL Final    MCH 06/26/2024 29.7  27.0 - 31.0 pg Final    MCHC 06/26/2024 34.6  33.0 - 36.0 g/dL Final    RDW 06/26/2024 12.6  11.5 - 17.0 % Final    Platelet 06/26/2024 54 (L)  130 - 400 x10(3)/mcL Final    MPV 06/26/2024 9.5  7.4 - 10.4 fL Final    Neut % 06/26/2024 50.9  % Final    Lymph % 06/26/2024 34.2  % Final    Mono % 06/26/2024 11.7  % Final    Eos % 06/26/2024 1.7  % Final    Basophil % 06/26/2024 1.1  % Final    Lymph # 06/26/2024 1.61  0.6 - 4.6 x10(3)/mcL Final    Neut # 06/26/2024 2.40  2.1 - 9.2 x10(3)/mcL Final    Mono # 06/26/2024 0.55  0.1 - 1.3 x10(3)/mcL Final    Eos # 06/26/2024 0.08  0  - 0.9 x10(3)/mcL Final    Baso # 06/26/2024 0.05  <=0.2 x10(3)/mcL Final    IG# 06/26/2024 0.02  0 - 0.04 x10(3)/mcL Final    IG% 06/26/2024 0.4  % Final         Assessment:       1. Idiopathic thrombocytopenic purpura (ITP)      Plan:       Patient with ITP, diagnosed 4/2024.  S/p IVIG X 2 days and 4 days of IV decadron 40mg completed 4/21/24.     CBC today with platelets 54,000. CBC is otherwise good.   Platelets have been up and down so will continue to monitor.     Plan to start Promacta if platelets drop <30K.     Will have him repeat his CBC in 2 weeks at [facility].   Follow-up in 4 weeks with CBC same day.   Promacta has already been ordered and insurance approved. He has the medication at home if needed.     CT C/A/P from 4/19/24 showed non-specific axillary adenopathy, but otherwise normal.   He had another CTA chest at [facility] on 4/27/24 when he went back to ED for chest pain. They mentioned again the right axillary enlarged nodes, measuring up to 3cm.  He was referred to Dr. Sonu Lozano for excisional biopsy. However, the patient could not take off of work so a core needle was obtained in the office on 5/14/24. Pathology showed no monotypic B-cell population, phenotypically abnormal T-cell population or blast cell population detected.   Follow-up US of the right axilla on 5/28/24 revealed the 3 cm ovoid hypoechoic focus with central increased echogenicity suspicious for a prominent partially fatty lymph node.   No plans for any further testing since the lymph node was benign.       All questions answered at this time.      Sheila Kuhn MD

## 2024-06-26 ENCOUNTER — LAB VISIT (OUTPATIENT)
Dept: LAB | Facility: HOSPITAL | Age: 49
End: 2024-06-26
Attending: NURSE PRACTITIONER
Payer: COMMERCIAL

## 2024-06-26 ENCOUNTER — OFFICE VISIT (OUTPATIENT)
Dept: HEMATOLOGY/ONCOLOGY | Facility: CLINIC | Age: 49
End: 2024-06-26
Payer: COMMERCIAL

## 2024-06-26 VITALS
DIASTOLIC BLOOD PRESSURE: 78 MMHG | SYSTOLIC BLOOD PRESSURE: 121 MMHG | OXYGEN SATURATION: 98 % | RESPIRATION RATE: 14 BRPM | HEART RATE: 64 BPM | HEIGHT: 67 IN | WEIGHT: 191.31 LBS | BODY MASS INDEX: 30.03 KG/M2 | TEMPERATURE: 98 F

## 2024-06-26 DIAGNOSIS — D69.3 IDIOPATHIC THROMBOCYTOPENIC PURPURA (ITP): ICD-10-CM

## 2024-06-26 DIAGNOSIS — D69.3 IDIOPATHIC THROMBOCYTOPENIC PURPURA (ITP): Primary | ICD-10-CM

## 2024-06-26 LAB
BASOPHILS # BLD AUTO: 0.05 X10(3)/MCL
BASOPHILS NFR BLD AUTO: 1.1 %
EOSINOPHIL # BLD AUTO: 0.08 X10(3)/MCL (ref 0–0.9)
EOSINOPHIL NFR BLD AUTO: 1.7 %
ERYTHROCYTE [DISTWIDTH] IN BLOOD BY AUTOMATED COUNT: 12.6 % (ref 11.5–17)
HCT VFR BLD AUTO: 41.9 % (ref 42–52)
HGB BLD-MCNC: 14.5 G/DL (ref 14–18)
IMM GRANULOCYTES # BLD AUTO: 0.02 X10(3)/MCL (ref 0–0.04)
IMM GRANULOCYTES NFR BLD AUTO: 0.4 %
LYMPHOCYTES # BLD AUTO: 1.61 X10(3)/MCL (ref 0.6–4.6)
LYMPHOCYTES NFR BLD AUTO: 34.2 %
MCH RBC QN AUTO: 29.7 PG (ref 27–31)
MCHC RBC AUTO-ENTMCNC: 34.6 G/DL (ref 33–36)
MCV RBC AUTO: 85.9 FL (ref 80–94)
MONOCYTES # BLD AUTO: 0.55 X10(3)/MCL (ref 0.1–1.3)
MONOCYTES NFR BLD AUTO: 11.7 %
NEUTROPHILS # BLD AUTO: 2.4 X10(3)/MCL (ref 2.1–9.2)
NEUTROPHILS NFR BLD AUTO: 50.9 %
PLATELET # BLD AUTO: 54 X10(3)/MCL (ref 130–400)
PMV BLD AUTO: 9.5 FL (ref 7.4–10.4)
RBC # BLD AUTO: 4.88 X10(6)/MCL (ref 4.7–6.1)
WBC # BLD AUTO: 4.71 X10(3)/MCL (ref 4.5–11.5)

## 2024-06-26 PROCEDURE — 36415 COLL VENOUS BLD VENIPUNCTURE: CPT | Mod: 91

## 2024-06-26 PROCEDURE — 99999 PR PBB SHADOW E&M-EST. PATIENT-LVL IV: CPT | Mod: PBBFAC,,, | Performed by: INTERNAL MEDICINE

## 2024-06-26 PROCEDURE — 1159F MED LIST DOCD IN RCRD: CPT | Mod: CPTII,S$GLB,, | Performed by: INTERNAL MEDICINE

## 2024-06-26 PROCEDURE — 1160F RVW MEDS BY RX/DR IN RCRD: CPT | Mod: CPTII,S$GLB,, | Performed by: INTERNAL MEDICINE

## 2024-06-26 PROCEDURE — 99214 OFFICE O/P EST MOD 30 MIN: CPT | Mod: S$GLB,,, | Performed by: INTERNAL MEDICINE

## 2024-06-26 PROCEDURE — 36415 COLL VENOUS BLD VENIPUNCTURE: CPT

## 2024-06-26 PROCEDURE — 3078F DIAST BP <80 MM HG: CPT | Mod: CPTII,S$GLB,, | Performed by: INTERNAL MEDICINE

## 2024-06-26 PROCEDURE — 85025 COMPLETE CBC W/AUTO DIFF WBC: CPT

## 2024-06-26 PROCEDURE — 3008F BODY MASS INDEX DOCD: CPT | Mod: CPTII,S$GLB,, | Performed by: INTERNAL MEDICINE

## 2024-06-26 PROCEDURE — 3074F SYST BP LT 130 MM HG: CPT | Mod: CPTII,S$GLB,, | Performed by: INTERNAL MEDICINE

## 2024-06-27 LAB — HEMATOLOGIST REVIEW: NORMAL

## 2024-07-11 ENCOUNTER — LAB VISIT (OUTPATIENT)
Dept: LAB | Facility: HOSPITAL | Age: 49
End: 2024-07-11
Attending: INTERNAL MEDICINE
Payer: COMMERCIAL

## 2024-07-11 DIAGNOSIS — D69.3 IDIOPATHIC THROMBOCYTOPENIC PURPURA (ITP): ICD-10-CM

## 2024-07-11 LAB
BASOPHILS # BLD AUTO: 0.05 X10(3)/MCL
BASOPHILS NFR BLD AUTO: 0.6 %
EOSINOPHIL # BLD AUTO: 0.11 X10(3)/MCL (ref 0–0.9)
EOSINOPHIL NFR BLD AUTO: 1.4 %
ERYTHROCYTE [DISTWIDTH] IN BLOOD BY AUTOMATED COUNT: 13 % (ref 11.5–17)
HCT VFR BLD AUTO: 44.4 % (ref 42–52)
HGB BLD-MCNC: 15 G/DL (ref 14–18)
IMM GRANULOCYTES # BLD AUTO: 0.04 X10(3)/MCL (ref 0–0.04)
IMM GRANULOCYTES NFR BLD AUTO: 0.5 %
LYMPHOCYTES # BLD AUTO: 1.67 X10(3)/MCL (ref 0.6–4.6)
LYMPHOCYTES NFR BLD AUTO: 21.1 %
MCH RBC QN AUTO: 29.6 PG (ref 27–31)
MCHC RBC AUTO-ENTMCNC: 33.8 G/DL (ref 33–36)
MCV RBC AUTO: 87.7 FL (ref 80–94)
MONOCYTES # BLD AUTO: 0.8 X10(3)/MCL (ref 0.1–1.3)
MONOCYTES NFR BLD AUTO: 10.1 %
NEUTROPHILS # BLD AUTO: 5.24 X10(3)/MCL (ref 2.1–9.2)
NEUTROPHILS NFR BLD AUTO: 66.3 %
PLATELET # BLD AUTO: 97 X10(3)/MCL (ref 130–400)
PMV BLD AUTO: 9.8 FL (ref 7.4–10.4)
RBC # BLD AUTO: 5.06 X10(6)/MCL (ref 4.7–6.1)
WBC # BLD AUTO: 7.91 X10(3)/MCL (ref 4.5–11.5)

## 2024-07-11 PROCEDURE — 36415 COLL VENOUS BLD VENIPUNCTURE: CPT

## 2024-07-11 PROCEDURE — 85025 COMPLETE CBC W/AUTO DIFF WBC: CPT

## 2024-07-22 NOTE — PROGRESS NOTES
Subjective:       Patient ID: Darian Morales is a 49 y.o. male.    ITP--Diagnosed 24    Work-up:  24--peripheral smear with severe thrombocytopenia, occasional giant forms, no schistocytes or clumps; vitamin B12 371, folate 9.2, normal PT and PTT, fibrinogen normal, GAYLE negative, hepatitis panel and HIV negative, Platelet IgM strongly positive.  S/p Right axillary LN core needle biopsy on 24--No monotypic B-cell population, phenotypically abnormal T-cell population or blast cell population detected.     Imagin. CT C/A/P 24--Nonspecific lymphadenopathy seen in the axillary regions bilaterally, mild hepatomegaly and some hepatic steatosis.  2. CT brain w/o contrast 24--No acute intracranial abnormality identified.   3. CTA chest done at [facility] 24--enlarged right axillary nodes, with largest measuring 3cm, left axilla is unremarkable.  4. CT soft tissue neck w/ and w/o contrast done 5/3/24--No convincing acute abnormality.  Focal right parapharyngeal calcification is nonspecific, differential includes sialolithiasis versus sequela of chronic tonsillar inflammation.  5. US abdomen/liver done 5/3/24--Hyperechoic appearance of the liver is suggestive of steatosis, otherwise, unremarkable sonographic evaluation.  6. Esophagram on 24--Small hiatus hernia with Schatzki ring and to and fro motion of contrast within the esophagus.    Platelets:  24--70  24--86  24--55  24--83  24--54  24--97  24--74    Treatment history:  Platelet transfusion 24.  Decadron 40mg IV X 4 days completed 24 + IVIG X 2 days completed 24.    Current treatment plan:   Observation  Start Promacta if platelets drop <30K.    Chief Complaint: Other Misc (Pt reports no concerns today.)    HPI  Patient presents for follow-up of ITP. Platelets are 74K today. He is doing good. Denies any complaints. No bleeding/bruising.     History reviewed. No pertinent past  medical history.   History reviewed. No pertinent surgical history.  Family History   Problem Relation Name Age of Onset    Diabetes Father Jimmy Morales     Heart disease Father Jimmy Morales     Diabetes Paternal Grandmother Mery Morales      Social History     Socioeconomic History    Marital status:    Tobacco Use    Smoking status: Never    Smokeless tobacco: Never   Substance and Sexual Activity    Alcohol use: Not Currently     Comment: occasionally    Drug use: Never    Sexual activity: Yes     Partners: Female     Birth control/protection: None     Social Determinants of Health     Financial Resource Strain: Low Risk  (4/20/2024)    Overall Financial Resource Strain (CARDIA)     Difficulty of Paying Living Expenses: Not very hard   Food Insecurity: No Food Insecurity (4/20/2024)    Hunger Vital Sign     Worried About Running Out of Food in the Last Year: Never true     Ran Out of Food in the Last Year: Never true   Transportation Needs: No Transportation Needs (4/20/2024)    PRAPARE - Transportation     Lack of Transportation (Medical): No     Lack of Transportation (Non-Medical): No   Stress: No Stress Concern Present (4/20/2024)    [employer] - Occupational Stress Questionnaire     Feeling of Stress : Not at all   Housing Stability: Low Risk  (4/20/2024)    Housing Stability Vital Sign     Unable to Pay for Housing in the Last Year: No     Homeless in the Last Year: No       Review of patient's allergies indicates:  No Known Allergies   Current Outpatient Medications on File Prior to Visit   Medication Sig Dispense Refill    ergocalciferol (ERGOCALCIFEROL) 50,000 unit Cap Take 50,000 Units by mouth every 7 days.      FLUoxetine 20 MG capsule       LEVSIN 0.125 mg Tab Take 125 mcg by mouth.      omeprazole (PRILOSEC) 40 MG capsule Take 40 mg by mouth every morning.      propranoloL (INDERAL) 40 MG tablet Take 20 mg by mouth as needed.      clotrimazole (MYCELEX)  "10 mg christian Take 10 mg by mouth 3 (three) times daily. (Patient not taking: Reported on 5/29/2024)      cyclobenzaprine (FLEXERIL) 5 MG tablet Take 5 mg by mouth every evening. (Patient not taking: Reported on 6/26/2024)      eltrombopag olamine (PROMACTA) 50 MG Tab Take 1 tablet (50 mg total) by mouth once daily. (Patient not taking: Reported on 5/14/2024) 30 tablet 5    fluticasone propionate (FLONASE) 50 mcg/actuation nasal spray Spray 2 sprays every day by intranasal route. (Patient not taking: Reported on 7/25/2024)      [DISCONTINUED] famotidine (PEPCID) 20 MG tablet Take 20 mg by mouth every evening. (Patient not taking: Reported on 7/25/2024)      [DISCONTINUED] pantoprazole (PROTONIX) 40 MG tablet Take 40 mg by mouth once daily. (Patient not taking: Reported on 7/25/2024)       No current facility-administered medications on file prior to visit.      Review of Systems   Constitutional:  Negative for appetite change and unexpected weight change.   HENT:  Negative for mouth sores.    Respiratory:  Negative for cough and shortness of breath.    Cardiovascular:  Negative for chest pain.   Gastrointestinal:  Negative for abdominal pain and diarrhea.   Genitourinary:  Negative for frequency.   Musculoskeletal:  Negative for back pain.   Integumentary:  Negative for rash.   Neurological:  Negative for headaches.   Hematological:  Negative for adenopathy.   Psychiatric/Behavioral:  The patient is nervous/anxious.          Vitals:    07/25/24 0908   BP: (!) 144/82   Pulse: 90   Resp: 14   Temp: 98.3 °F (36.8 °C)   TempSrc: Oral   SpO2: 97%   Weight: 90.8 kg (200 lb 3.2 oz)   Height: 5' 7" (1.702 m)         Wt Readings from Last 3 Encounters:   07/25/24 0908 90.8 kg (200 lb 3.2 oz)   06/26/24 1249 86.8 kg (191 lb 4.8 oz)   05/29/24 1456 86.4 kg (190 lb 8 oz)     Physical Exam  Constitutional:       General: He is awake.      Appearance: Normal appearance. He is normal weight.   HENT:      Head: Normocephalic and " atraumatic.      Nose: Nose normal.      Mouth/Throat:      Mouth: Mucous membranes are moist.   Eyes:      General: Vision grossly intact.      Extraocular Movements: Extraocular movements intact.      Conjunctiva/sclera: Conjunctivae normal.   Cardiovascular:      Rate and Rhythm: Normal rate and regular rhythm.      Heart sounds: Normal heart sounds.   Pulmonary:      Effort: Pulmonary effort is normal.      Breath sounds: Normal breath sounds.   Abdominal:      General: Abdomen is protuberant. Bowel sounds are normal. There is no distension.      Palpations: Abdomen is soft.      Tenderness: There is no abdominal tenderness.   Musculoskeletal:      Cervical back: Normal range of motion and neck supple.      Right lower leg: No edema.      Left lower leg: No edema.   Lymphadenopathy:      Cervical: No cervical adenopathy.      Upper Body:      Right upper body: No supraclavicular adenopathy.      Left upper body: No supraclavicular adenopathy.   Skin:     General: Skin is warm and dry.   Neurological:      Mental Status: He is alert and oriented to person, place, and time.      Motor: Motor function is intact.   Psychiatric:         Mood and Affect: Mood normal.         Speech: Speech normal.         Behavior: Behavior is cooperative.         Judgment: Judgment normal.       Lab Visit on 07/25/2024   Component Date Value Ref Range Status    WBC 07/25/2024 4.78  4.50 - 11.50 x10(3)/mcL Final    RBC 07/25/2024 5.04  4.70 - 6.10 x10(6)/mcL Final    Hgb 07/25/2024 15.1  14.0 - 18.0 g/dL Final    Hct 07/25/2024 44.0  42.0 - 52.0 % Final    MCV 07/25/2024 87.3  80.0 - 94.0 fL Final    MCH 07/25/2024 30.0  27.0 - 31.0 pg Final    MCHC 07/25/2024 34.3  33.0 - 36.0 g/dL Final    RDW 07/25/2024 12.4  11.5 - 17.0 % Final    Platelet 07/25/2024 74 (L)  130 - 400 x10(3)/mcL Final    MPV 07/25/2024 9.5  7.4 - 10.4 fL Final    Neut % 07/25/2024 53.0  % Final    Lymph % 07/25/2024 32.6  % Final    Mono % 07/25/2024 10.5  % Final     Eos % 07/25/2024 2.7  % Final    Basophil % 07/25/2024 0.8  % Final    Lymph # 07/25/2024 1.56  0.6 - 4.6 x10(3)/mcL Final    Neut # 07/25/2024 2.53  2.1 - 9.2 x10(3)/mcL Final    Mono # 07/25/2024 0.50  0.1 - 1.3 x10(3)/mcL Final    Eos # 07/25/2024 0.13  0 - 0.9 x10(3)/mcL Final    Baso # 07/25/2024 0.04  <=0.2 x10(3)/mcL Final    IG# 07/25/2024 0.02  0 - 0.04 x10(3)/mcL Final    IG% 07/25/2024 0.4  % Final         Assessment:       1. Idiopathic thrombocytopenic purpura (ITP)        Plan:       Patient with ITP, diagnosed 4/2024.  S/p IVIG X 2 days and 4 days of IV decadron 40mg completed 4/21/24.     CBC today with platelets 74,000, improved. CBC is otherwise good.   Platelets have been improved over the last 1 month.   Will continue to monitor closely.    Follow-up in 2 months with CBC same day.     Promacta has already been ordered and insurance approved. He has the medication at home if needed.   Plan to start Promacta if platelets drop <30K.     CT C/A/P from 4/19/24 showed non-specific axillary adenopathy, but otherwise normal.   He had another CTA chest at [facility] on 4/27/24 when he went back to ED for chest pain. They mentioned again the right axillary enlarged nodes, measuring up to 3cm.  Patient referred Dr. Sonu Lozano for biopsy, core needle was obtained in the office on 5/14/24. Pathology showed no monotypic B-cell population, phenotypically abnormal T-cell population or blast cell population detected.   Follow-up US of the right axilla on 5/28/24 revealed the 3 cm ovoid hypoechoic focus with central increased echogenicity suspicious for a prominent partially fatty lymph node.   No plans for any further testing since the lymph node was benign.     All questions answered at this time.      Sheila Kuhn MD

## 2024-07-25 ENCOUNTER — OFFICE VISIT (OUTPATIENT)
Dept: HEMATOLOGY/ONCOLOGY | Facility: CLINIC | Age: 49
End: 2024-07-25
Payer: COMMERCIAL

## 2024-07-25 ENCOUNTER — LAB VISIT (OUTPATIENT)
Dept: LAB | Facility: HOSPITAL | Age: 49
End: 2024-07-25
Attending: INTERNAL MEDICINE
Payer: COMMERCIAL

## 2024-07-25 VITALS
HEART RATE: 90 BPM | WEIGHT: 200.19 LBS | BODY MASS INDEX: 31.42 KG/M2 | RESPIRATION RATE: 14 BRPM | SYSTOLIC BLOOD PRESSURE: 144 MMHG | DIASTOLIC BLOOD PRESSURE: 82 MMHG | TEMPERATURE: 98 F | OXYGEN SATURATION: 97 % | HEIGHT: 67 IN

## 2024-07-25 DIAGNOSIS — D69.3 IDIOPATHIC THROMBOCYTOPENIC PURPURA (ITP): ICD-10-CM

## 2024-07-25 DIAGNOSIS — D69.3 IDIOPATHIC THROMBOCYTOPENIC PURPURA (ITP): Primary | ICD-10-CM

## 2024-07-25 LAB
BASOPHILS # BLD AUTO: 0.04 X10(3)/MCL
BASOPHILS NFR BLD AUTO: 0.8 %
EOSINOPHIL # BLD AUTO: 0.13 X10(3)/MCL (ref 0–0.9)
EOSINOPHIL NFR BLD AUTO: 2.7 %
ERYTHROCYTE [DISTWIDTH] IN BLOOD BY AUTOMATED COUNT: 12.4 % (ref 11.5–17)
HCT VFR BLD AUTO: 44 % (ref 42–52)
HGB BLD-MCNC: 15.1 G/DL (ref 14–18)
IMM GRANULOCYTES # BLD AUTO: 0.02 X10(3)/MCL (ref 0–0.04)
IMM GRANULOCYTES NFR BLD AUTO: 0.4 %
LYMPHOCYTES # BLD AUTO: 1.56 X10(3)/MCL (ref 0.6–4.6)
LYMPHOCYTES NFR BLD AUTO: 32.6 %
MCH RBC QN AUTO: 30 PG (ref 27–31)
MCHC RBC AUTO-ENTMCNC: 34.3 G/DL (ref 33–36)
MCV RBC AUTO: 87.3 FL (ref 80–94)
MONOCYTES # BLD AUTO: 0.5 X10(3)/MCL (ref 0.1–1.3)
MONOCYTES NFR BLD AUTO: 10.5 %
NEUTROPHILS # BLD AUTO: 2.53 X10(3)/MCL (ref 2.1–9.2)
NEUTROPHILS NFR BLD AUTO: 53 %
PLATELET # BLD AUTO: 74 X10(3)/MCL (ref 130–400)
PMV BLD AUTO: 9.5 FL (ref 7.4–10.4)
RBC # BLD AUTO: 5.04 X10(6)/MCL (ref 4.7–6.1)
WBC # BLD AUTO: 4.78 X10(3)/MCL (ref 4.5–11.5)

## 2024-07-25 PROCEDURE — 1159F MED LIST DOCD IN RCRD: CPT | Mod: CPTII,S$GLB,, | Performed by: INTERNAL MEDICINE

## 2024-07-25 PROCEDURE — 99999 PR PBB SHADOW E&M-EST. PATIENT-LVL IV: CPT | Mod: PBBFAC,,, | Performed by: INTERNAL MEDICINE

## 2024-07-25 PROCEDURE — 85025 COMPLETE CBC W/AUTO DIFF WBC: CPT

## 2024-07-25 PROCEDURE — 36415 COLL VENOUS BLD VENIPUNCTURE: CPT

## 2024-07-25 PROCEDURE — 3077F SYST BP >= 140 MM HG: CPT | Mod: CPTII,S$GLB,, | Performed by: INTERNAL MEDICINE

## 2024-07-25 PROCEDURE — 99214 OFFICE O/P EST MOD 30 MIN: CPT | Mod: S$GLB,,, | Performed by: INTERNAL MEDICINE

## 2024-07-25 PROCEDURE — 1160F RVW MEDS BY RX/DR IN RCRD: CPT | Mod: CPTII,S$GLB,, | Performed by: INTERNAL MEDICINE

## 2024-07-25 PROCEDURE — 3008F BODY MASS INDEX DOCD: CPT | Mod: CPTII,S$GLB,, | Performed by: INTERNAL MEDICINE

## 2024-07-25 PROCEDURE — 3079F DIAST BP 80-89 MM HG: CPT | Mod: CPTII,S$GLB,, | Performed by: INTERNAL MEDICINE

## 2024-07-25 RX ORDER — OMEPRAZOLE 40 MG/1
40 CAPSULE, DELAYED RELEASE ORAL EVERY MORNING
COMMUNITY
Start: 2024-07-03

## 2024-07-25 RX ORDER — PANTOPRAZOLE SODIUM 40 MG/1
40 TABLET, DELAYED RELEASE ORAL DAILY
COMMUNITY
Start: 2024-07-03 | End: 2024-07-25

## 2024-09-03 ENCOUNTER — LAB VISIT (OUTPATIENT)
Dept: LAB | Facility: HOSPITAL | Age: 49
End: 2024-09-03
Attending: FAMILY MEDICINE
Payer: COMMERCIAL

## 2024-09-03 DIAGNOSIS — Z12.5 SPECIAL SCREENING, PROSTATE CANCER: ICD-10-CM

## 2024-09-03 DIAGNOSIS — E55.9 VITAMIN D DEFICIENCY: ICD-10-CM

## 2024-09-03 DIAGNOSIS — Z00.00 ROUTINE GENERAL MEDICAL EXAMINATION AT A HEALTH CARE FACILITY: Primary | ICD-10-CM

## 2024-09-03 LAB
25(OH)D3+25(OH)D2 SERPL-MCNC: 42 NG/ML (ref 30–80)
ALBUMIN SERPL-MCNC: 4.3 G/DL (ref 3.5–5)
ALBUMIN/GLOB SERPL: 1.2 RATIO (ref 1.1–2)
ALP SERPL-CCNC: 79 UNIT/L (ref 40–150)
ALT SERPL-CCNC: 36 UNIT/L (ref 0–55)
ANION GAP SERPL CALC-SCNC: 9 MEQ/L
AST SERPL-CCNC: 26 UNIT/L (ref 5–34)
BILIRUB SERPL-MCNC: 0.4 MG/DL
BUN SERPL-MCNC: 13 MG/DL (ref 8.9–20.6)
CALCIUM SERPL-MCNC: 10 MG/DL (ref 8.4–10.2)
CHLORIDE SERPL-SCNC: 106 MMOL/L (ref 98–107)
CHOLEST SERPL-MCNC: 286 MG/DL
CHOLEST/HDLC SERPL: 7 {RATIO} (ref 0–5)
CO2 SERPL-SCNC: 25 MMOL/L (ref 22–29)
CREAT SERPL-MCNC: 1.18 MG/DL (ref 0.73–1.18)
CREAT/UREA NIT SERPL: 11
ERYTHROCYTE [DISTWIDTH] IN BLOOD BY AUTOMATED COUNT: 12.4 % (ref 11.5–17)
GFR SERPLBLD CREATININE-BSD FMLA CKD-EPI: >60 ML/MIN/1.73/M2
GLOBULIN SER-MCNC: 3.7 GM/DL (ref 2.4–3.5)
GLUCOSE SERPL-MCNC: 102 MG/DL (ref 74–100)
HCT VFR BLD AUTO: 46 % (ref 42–52)
HDLC SERPL-MCNC: 41 MG/DL (ref 35–60)
HGB BLD-MCNC: 15.7 G/DL (ref 14–18)
LDLC SERPL CALC-MCNC: 176 MG/DL (ref 50–140)
MCH RBC QN AUTO: 29.7 PG (ref 27–31)
MCHC RBC AUTO-ENTMCNC: 34.1 G/DL (ref 33–36)
MCV RBC AUTO: 87.1 FL (ref 80–94)
PLATELET # BLD AUTO: 81 X10(3)/MCL (ref 130–400)
PMV BLD AUTO: 9.4 FL (ref 7.4–10.4)
POTASSIUM SERPL-SCNC: 3.8 MMOL/L (ref 3.5–5.1)
PROT SERPL-MCNC: 8 GM/DL (ref 6.4–8.3)
PSA SERPL-MCNC: 0.77 NG/ML
RBC # BLD AUTO: 5.28 X10(6)/MCL (ref 4.7–6.1)
SODIUM SERPL-SCNC: 140 MMOL/L (ref 136–145)
TRIGL SERPL-MCNC: 347 MG/DL (ref 34–140)
TSH SERPL-ACNC: 3.74 UIU/ML (ref 0.35–4.94)
VLDLC SERPL CALC-MCNC: 69 MG/DL
WBC # BLD AUTO: 4.44 X10(3)/MCL (ref 4.5–11.5)

## 2024-09-03 PROCEDURE — 84443 ASSAY THYROID STIM HORMONE: CPT

## 2024-09-03 PROCEDURE — 84153 ASSAY OF PSA TOTAL: CPT

## 2024-09-03 PROCEDURE — 82306 VITAMIN D 25 HYDROXY: CPT

## 2024-09-03 PROCEDURE — 36415 COLL VENOUS BLD VENIPUNCTURE: CPT

## 2024-09-03 PROCEDURE — 80053 COMPREHEN METABOLIC PANEL: CPT

## 2024-09-03 PROCEDURE — 85027 COMPLETE CBC AUTOMATED: CPT

## 2024-09-03 PROCEDURE — 80061 LIPID PANEL: CPT

## 2024-10-02 ENCOUNTER — LAB VISIT (OUTPATIENT)
Dept: LAB | Facility: HOSPITAL | Age: 49
End: 2024-10-02
Attending: INTERNAL MEDICINE
Payer: COMMERCIAL

## 2024-10-02 ENCOUNTER — OFFICE VISIT (OUTPATIENT)
Dept: HEMATOLOGY/ONCOLOGY | Facility: CLINIC | Age: 49
End: 2024-10-02
Payer: COMMERCIAL

## 2024-10-02 VITALS
SYSTOLIC BLOOD PRESSURE: 129 MMHG | RESPIRATION RATE: 14 BRPM | HEART RATE: 68 BPM | WEIGHT: 203.31 LBS | HEIGHT: 67 IN | BODY MASS INDEX: 31.91 KG/M2 | TEMPERATURE: 98 F | DIASTOLIC BLOOD PRESSURE: 83 MMHG | OXYGEN SATURATION: 96 %

## 2024-10-02 DIAGNOSIS — D69.3 IDIOPATHIC THROMBOCYTOPENIC PURPURA (ITP): ICD-10-CM

## 2024-10-02 DIAGNOSIS — D69.3 IDIOPATHIC THROMBOCYTOPENIC PURPURA (ITP): Primary | ICD-10-CM

## 2024-10-02 LAB
BASOPHILS # BLD AUTO: 0.04 X10(3)/MCL
BASOPHILS NFR BLD AUTO: 0.8 %
EOSINOPHIL # BLD AUTO: 0.15 X10(3)/MCL (ref 0–0.9)
EOSINOPHIL NFR BLD AUTO: 3.1 %
ERYTHROCYTE [DISTWIDTH] IN BLOOD BY AUTOMATED COUNT: 11.9 % (ref 11.5–17)
HCT VFR BLD AUTO: 42.9 % (ref 42–52)
HGB BLD-MCNC: 15.1 G/DL (ref 14–18)
IMM GRANULOCYTES # BLD AUTO: 0.01 X10(3)/MCL (ref 0–0.04)
IMM GRANULOCYTES NFR BLD AUTO: 0.2 %
LYMPHOCYTES # BLD AUTO: 1.45 X10(3)/MCL (ref 0.6–4.6)
LYMPHOCYTES NFR BLD AUTO: 30.3 %
MCH RBC QN AUTO: 30.6 PG (ref 27–31)
MCHC RBC AUTO-ENTMCNC: 35.2 G/DL (ref 33–36)
MCV RBC AUTO: 86.8 FL (ref 80–94)
MONOCYTES # BLD AUTO: 0.52 X10(3)/MCL (ref 0.1–1.3)
MONOCYTES NFR BLD AUTO: 10.9 %
NEUTROPHILS # BLD AUTO: 2.61 X10(3)/MCL (ref 2.1–9.2)
NEUTROPHILS NFR BLD AUTO: 54.7 %
PLATELET # BLD AUTO: 63 X10(3)/MCL (ref 130–400)
PMV BLD AUTO: 9.7 FL (ref 7.4–10.4)
RBC # BLD AUTO: 4.94 X10(6)/MCL (ref 4.7–6.1)
WBC # BLD AUTO: 4.78 X10(3)/MCL (ref 4.5–11.5)

## 2024-10-02 PROCEDURE — 99213 OFFICE O/P EST LOW 20 MIN: CPT | Mod: S$GLB,,, | Performed by: NURSE PRACTITIONER

## 2024-10-02 PROCEDURE — 99999 PR PBB SHADOW E&M-EST. PATIENT-LVL IV: CPT | Mod: PBBFAC,,, | Performed by: NURSE PRACTITIONER

## 2024-10-02 PROCEDURE — 1159F MED LIST DOCD IN RCRD: CPT | Mod: CPTII,S$GLB,, | Performed by: NURSE PRACTITIONER

## 2024-10-02 PROCEDURE — 3008F BODY MASS INDEX DOCD: CPT | Mod: CPTII,S$GLB,, | Performed by: NURSE PRACTITIONER

## 2024-10-02 PROCEDURE — 1160F RVW MEDS BY RX/DR IN RCRD: CPT | Mod: CPTII,S$GLB,, | Performed by: NURSE PRACTITIONER

## 2024-10-02 PROCEDURE — 36415 COLL VENOUS BLD VENIPUNCTURE: CPT

## 2024-10-02 PROCEDURE — 3079F DIAST BP 80-89 MM HG: CPT | Mod: CPTII,S$GLB,, | Performed by: NURSE PRACTITIONER

## 2024-10-02 PROCEDURE — 85025 COMPLETE CBC W/AUTO DIFF WBC: CPT

## 2024-10-02 PROCEDURE — 3074F SYST BP LT 130 MM HG: CPT | Mod: CPTII,S$GLB,, | Performed by: NURSE PRACTITIONER

## 2024-10-02 NOTE — PROGRESS NOTES
Subjective:       Patient ID: Darian Morales is a 49 y.o. male.    ITP--Diagnosed 24    Work-up:  24--peripheral smear with severe thrombocytopenia, occasional giant forms, no schistocytes or clumps; vitamin B12 371, folate 9.2, normal PT and PTT, fibrinogen normal, GAYLE negative, hepatitis panel and HIV negative, Platelet IgM strongly positive.  S/p Right axillary LN core needle biopsy on 24--No monotypic B-cell population, phenotypically abnormal T-cell population or blast cell population detected.     Imagin. CT C/A/P 24--Nonspecific lymphadenopathy seen in the axillary regions bilaterally, mild hepatomegaly and some hepatic steatosis.  2. CT brain w/o contrast 24--No acute intracranial abnormality identified.   3. CTA chest done at [facility] 24--enlarged right axillary nodes, with largest measuring 3cm, left axilla is unremarkable.  4. CT soft tissue neck w/ and w/o contrast done 5/3/24--No convincing acute abnormality.  Focal right parapharyngeal calcification is nonspecific, differential includes sialolithiasis versus sequela of chronic tonsillar inflammation.  5. US abdomen/liver done 5/3/24--Hyperechoic appearance of the liver is suggestive of steatosis, otherwise, unremarkable sonographic evaluation.  6. Esophagram on 24--Small hiatus hernia with Schatzki ring and to and fro motion of contrast within the esophagus.    Platelets:  24--70  24--86  24--55  24--83  24--54  24--97  24--74  10/02/24--63    Treatment history:  Platelet transfusion 24.  Decadron 40mg IV X 4 days completed 24 + IVIG X 2 days completed 24.    Current treatment plan:   Observation  Start Promacta if platelets drop <30K.    Chief Complaint: Other Misc (Pt reports no concerns today.)    HPI  Patient presents for follow-up of ITP. He is dong very well. Platelets are 63K today. Denies any complaints. No bleeding/bruising. Scheduled to see his ENT  next week. No new problems reported.    History reviewed. No pertinent past medical history.   History reviewed. No pertinent surgical history.  Family History   Problem Relation Name Age of Onset    Diabetes Father Jimmy Morales     Heart disease Father Jimmy Morales     Diabetes Paternal Grandmother Mery Morales      Social History     Socioeconomic History    Marital status:    Tobacco Use    Smoking status: Never    Smokeless tobacco: Never   Substance and Sexual Activity    Alcohol use: Not Currently     Comment: occasionally    Drug use: Never    Sexual activity: Yes     Partners: Female     Birth control/protection: None     Social Drivers of Health     Financial Resource Strain: Low Risk  (4/20/2024)    Overall Financial Resource Strain (CARDIA)     Difficulty of Paying Living Expenses: Not very hard   Food Insecurity: No Food Insecurity (4/20/2024)    Hunger Vital Sign     Worried About Running Out of Food in the Last Year: Never true     Ran Out of Food in the Last Year: Never true   Transportation Needs: No Transportation Needs (4/20/2024)    PRAPARE - Transportation     Lack of Transportation (Medical): No     Lack of Transportation (Non-Medical): No   Stress: No Stress Concern Present (4/20/2024)    [employer] - Occupational Stress Questionnaire     Feeling of Stress : Not at all   Housing Stability: Low Risk  (4/20/2024)    Housing Stability Vital Sign     Unable to Pay for Housing in the Last Year: No     Homeless in the Last Year: No       Review of patient's allergies indicates:  No Known Allergies   Current Outpatient Medications on File Prior to Visit   Medication Sig Dispense Refill    ergocalciferol (ERGOCALCIFEROL) 50,000 unit Cap Take 50,000 Units by mouth every 7 days.      FLUoxetine 20 MG capsule       omeprazole (PRILOSEC) 40 MG capsule Take 40 mg by mouth every morning.      propranoloL (INDERAL) 40 MG tablet Take 20 mg by mouth as needed.       "eltrombopag olamine (PROMACTA) 50 MG Tab Take 1 tablet (50 mg total) by mouth once daily. (Patient not taking: Reported on 10/2/2024) 30 tablet 5    [DISCONTINUED] clotrimazole (MYCELEX) 10 mg christian Take 10 mg by mouth 3 (three) times daily. (Patient not taking: Reported on 10/2/2024)      [DISCONTINUED] cyclobenzaprine (FLEXERIL) 5 MG tablet Take 5 mg by mouth every evening. (Patient not taking: Reported on 10/2/2024)      [DISCONTINUED] fluticasone propionate (FLONASE) 50 mcg/actuation nasal spray Spray 2 sprays every day by intranasal route. (Patient not taking: Reported on 10/2/2024)      [DISCONTINUED] LEVSIN 0.125 mg Tab Take 125 mcg by mouth. (Patient not taking: Reported on 10/2/2024)       No current facility-administered medications on file prior to visit.      Review of Systems   Constitutional:  Negative for appetite change and unexpected weight change.   HENT:  Negative for mouth sores.    Eyes:  Negative for visual disturbance.   Respiratory:  Negative for cough and shortness of breath.    Cardiovascular:  Negative for chest pain.   Gastrointestinal:  Negative for abdominal pain and diarrhea.   Genitourinary:  Negative for frequency.   Musculoskeletal:  Negative for back pain.   Integumentary:  Negative for rash.   Neurological:  Negative for headaches.   Hematological:  Negative for adenopathy.         Vitals:    10/02/24 1450   BP: 129/83   Pulse: 68   Resp: 14   Temp: 98 °F (36.7 °C)   TempSrc: Oral   SpO2: 96%   Weight: 92.2 kg (203 lb 4.8 oz)   Height: 5' 7" (1.702 m)     Wt Readings from Last 3 Encounters:   10/02/24 1450 92.2 kg (203 lb 4.8 oz)   07/25/24 0908 90.8 kg (200 lb 3.2 oz)   06/26/24 1249 86.8 kg (191 lb 4.8 oz)     Physical Exam  Constitutional:       General: He is awake.      Appearance: Normal appearance. He is normal weight.   HENT:      Head: Normocephalic and atraumatic.      Nose: Nose normal.   Eyes:      General: Vision grossly intact.      Extraocular Movements: " Extraocular movements intact.      Conjunctiva/sclera: Conjunctivae normal.   Cardiovascular:      Rate and Rhythm: Normal rate and regular rhythm.      Heart sounds: Normal heart sounds.   Pulmonary:      Effort: Pulmonary effort is normal.      Breath sounds: Normal breath sounds.   Abdominal:      General: Abdomen is protuberant. Bowel sounds are normal. There is no distension.      Palpations: Abdomen is soft.      Tenderness: There is no abdominal tenderness.   Musculoskeletal:      Cervical back: Normal range of motion and neck supple.      Right lower leg: No edema.      Left lower leg: No edema.   Skin:     General: Skin is warm and dry.   Neurological:      Mental Status: He is alert and oriented to person, place, and time.      Motor: Motor function is intact.   Psychiatric:         Mood and Affect: Mood normal.         Speech: Speech normal.         Behavior: Behavior is cooperative.         Judgment: Judgment normal.       Lab Visit on 10/02/2024   Component Date Value Ref Range Status    WBC 10/02/2024 4.78  4.50 - 11.50 x10(3)/mcL Final    RBC 10/02/2024 4.94  4.70 - 6.10 x10(6)/mcL Final    Hgb 10/02/2024 15.1  14.0 - 18.0 g/dL Final    Hct 10/02/2024 42.9  42.0 - 52.0 % Final    MCV 10/02/2024 86.8  80.0 - 94.0 fL Final    MCH 10/02/2024 30.6  27.0 - 31.0 pg Final    MCHC 10/02/2024 35.2  33.0 - 36.0 g/dL Final    RDW 10/02/2024 11.9  11.5 - 17.0 % Final    Platelet 10/02/2024 63 (L)  130 - 400 x10(3)/mcL Final    MPV 10/02/2024 9.7  7.4 - 10.4 fL Final    Neut % 10/02/2024 54.7  % Final    Lymph % 10/02/2024 30.3  % Final    Mono % 10/02/2024 10.9  % Final    Eos % 10/02/2024 3.1  % Final    Basophil % 10/02/2024 0.8  % Final    Lymph # 10/02/2024 1.45  0.6 - 4.6 x10(3)/mcL Final    Neut # 10/02/2024 2.61  2.1 - 9.2 x10(3)/mcL Final    Mono # 10/02/2024 0.52  0.1 - 1.3 x10(3)/mcL Final    Eos # 10/02/2024 0.15  0 - 0.9 x10(3)/mcL Final    Baso # 10/02/2024 0.04  <=0.2 x10(3)/mcL Final    IG#  10/02/2024 0.01  0 - 0.04 x10(3)/mcL Final    IG% 10/02/2024 0.2  % Final         Assessment:       1. Idiopathic thrombocytopenic purpura (ITP)        Plan:       Patient with ITP, diagnosed 4/2024.  S/p IVIG X 2 days and 4 days of IV decadron 40mg completed 4/21/24.     CBC today with platelets 63,000 which is slightly lower than last month.   CBC is otherwise good.   Promacta has already been ordered and insurance approved. He has the medication at home if needed. Plan to start Promacta if platelets drop <30K.   Will continue with monthly CBC in [place] to ensure stability.  Follow-up in 3 months after CBC obtained, can be virtual.     CT C/A/P from 4/19/24 showed non-specific axillary adenopathy, but otherwise normal.   He had another [facility] chest at [facility] on 4/27/24 when he went back to ED for chest pain. They mentioned again the right axillary enlarged nodes, measuring up to 3cm.  Patient referred Dr. Sonu Lozano for biopsy, core needle was obtained in the office on 5/14/24. Pathology showed no monotypic B-cell population, phenotypically abnormal T-cell population or blast cell population detected.   Follow-up US of the right axilla on 5/28/24 revealed the 3 cm ovoid hypoechoic focus with central increased echogenicity suspicious for a prominent partially fatty lymph node.   No plans for any further testing since the lymph node was benign.     All questions answered at this time.      Shon Rodriguez, BHAVANI

## 2024-11-15 ENCOUNTER — LAB VISIT (OUTPATIENT)
Dept: LAB | Facility: HOSPITAL | Age: 49
End: 2024-11-15
Attending: INTERNAL MEDICINE
Payer: COMMERCIAL

## 2024-11-15 DIAGNOSIS — D69.3 IDIOPATHIC THROMBOCYTOPENIC PURPURA (ITP): ICD-10-CM

## 2024-11-15 LAB
BASOPHILS # BLD AUTO: 0.05 X10(3)/MCL
BASOPHILS NFR BLD AUTO: 1.2 %
EOSINOPHIL # BLD AUTO: 0.16 X10(3)/MCL (ref 0–0.9)
EOSINOPHIL NFR BLD AUTO: 3.7 %
ERYTHROCYTE [DISTWIDTH] IN BLOOD BY AUTOMATED COUNT: 12.1 % (ref 11.5–17)
HCT VFR BLD AUTO: 45 % (ref 42–52)
HGB BLD-MCNC: 15.6 G/DL (ref 14–18)
IMM GRANULOCYTES # BLD AUTO: 0.04 X10(3)/MCL (ref 0–0.04)
IMM GRANULOCYTES NFR BLD AUTO: 0.9 %
LYMPHOCYTES # BLD AUTO: 1.53 X10(3)/MCL (ref 0.6–4.6)
LYMPHOCYTES NFR BLD AUTO: 35.6 %
MCH RBC QN AUTO: 29.9 PG (ref 27–31)
MCHC RBC AUTO-ENTMCNC: 34.7 G/DL (ref 33–36)
MCV RBC AUTO: 86.4 FL (ref 80–94)
MONOCYTES # BLD AUTO: 0.46 X10(3)/MCL (ref 0.1–1.3)
MONOCYTES NFR BLD AUTO: 10.7 %
NEUTROPHILS # BLD AUTO: 2.06 X10(3)/MCL (ref 2.1–9.2)
NEUTROPHILS NFR BLD AUTO: 47.9 %
PLATELET # BLD AUTO: 129 X10(3)/MCL (ref 130–400)
PMV BLD AUTO: 9.5 FL (ref 7.4–10.4)
RBC # BLD AUTO: 5.21 X10(6)/MCL (ref 4.7–6.1)
WBC # BLD AUTO: 4.3 X10(3)/MCL (ref 4.5–11.5)

## 2024-11-15 PROCEDURE — 85025 COMPLETE CBC W/AUTO DIFF WBC: CPT

## 2024-11-15 PROCEDURE — 36415 COLL VENOUS BLD VENIPUNCTURE: CPT

## 2024-12-30 ENCOUNTER — LAB VISIT (OUTPATIENT)
Dept: LAB | Facility: HOSPITAL | Age: 49
End: 2024-12-30
Attending: INTERNAL MEDICINE
Payer: COMMERCIAL

## 2024-12-30 DIAGNOSIS — D69.3 IDIOPATHIC THROMBOCYTOPENIC PURPURA (ITP): ICD-10-CM

## 2024-12-30 LAB
BASOPHILS # BLD AUTO: 0.03 X10(3)/MCL
BASOPHILS NFR BLD AUTO: 0.5 %
EOSINOPHIL # BLD AUTO: 0.19 X10(3)/MCL (ref 0–0.9)
EOSINOPHIL NFR BLD AUTO: 3.3 %
ERYTHROCYTE [DISTWIDTH] IN BLOOD BY AUTOMATED COUNT: 12.1 % (ref 11.5–17)
HCT VFR BLD AUTO: 45.9 % (ref 42–52)
HGB BLD-MCNC: 15.7 G/DL (ref 14–18)
IMM GRANULOCYTES # BLD AUTO: 0.04 X10(3)/MCL (ref 0–0.04)
IMM GRANULOCYTES NFR BLD AUTO: 0.7 %
LYMPHOCYTES # BLD AUTO: 1.89 X10(3)/MCL (ref 0.6–4.6)
LYMPHOCYTES NFR BLD AUTO: 33 %
MCH RBC QN AUTO: 29.6 PG (ref 27–31)
MCHC RBC AUTO-ENTMCNC: 34.2 G/DL (ref 33–36)
MCV RBC AUTO: 86.4 FL (ref 80–94)
MONOCYTES # BLD AUTO: 0.66 X10(3)/MCL (ref 0.1–1.3)
MONOCYTES NFR BLD AUTO: 11.5 %
NEUTROPHILS # BLD AUTO: 2.92 X10(3)/MCL (ref 2.1–9.2)
NEUTROPHILS NFR BLD AUTO: 51 %
PLATELET # BLD AUTO: 90 X10(3)/MCL (ref 130–400)
PMV BLD AUTO: 9.4 FL (ref 7.4–10.4)
RBC # BLD AUTO: 5.31 X10(6)/MCL (ref 4.7–6.1)
WBC # BLD AUTO: 5.73 X10(3)/MCL (ref 4.5–11.5)

## 2024-12-30 PROCEDURE — 36415 COLL VENOUS BLD VENIPUNCTURE: CPT

## 2024-12-30 PROCEDURE — 85025 COMPLETE CBC W/AUTO DIFF WBC: CPT

## 2025-01-08 NOTE — PROGRESS NOTES
Subjective:       Patient ID: Darian Morales is a 49 y.o. male.    ITP--Diagnosed 24    Work-up:  24--peripheral smear with severe thrombocytopenia, occasional giant forms, no schistocytes or clumps; vitamin B12 371, folate 9.2, normal PT and PTT, fibrinogen normal, GAYLE negative, hepatitis panel and HIV negative, Platelet IgM strongly positive.  S/p Right axillary LN core needle biopsy on 24--No monotypic B-cell population, phenotypically abnormal T-cell population or blast cell population detected.     Imagin. CT C/A/P 24--Nonspecific lymphadenopathy seen in the axillary regions bilaterally, mild hepatomegaly and some hepatic steatosis.  2. CT brain w/o contrast 24--No acute intracranial abnormality identified.   3. CTA chest done at [facility] 24--enlarged right axillary nodes, with largest measuring 3cm, left axilla is unremarkable.  4. CT soft tissue neck w/ and w/o contrast done 5/3/24--No convincing acute abnormality.  Focal right parapharyngeal calcification is nonspecific, differential includes sialolithiasis versus sequela of chronic tonsillar inflammation.  5. US abdomen/liver done 5/3/24--Hyperechoic appearance of the liver is suggestive of steatosis, otherwise, unremarkable sonographic evaluation.  6. Esophagram on 24--Small hiatus hernia with Schatzki ring and to and fro motion of contrast within the esophagus.    Platelets:  24--70  24--86  24--55  24--83  24--54  24--97  24--74  10/02/24--63  24--90  25--115    Treatment history:  Platelet transfusion 24.  Decadron 40mg IV X 4 days completed 24 + IVIG X 2 days completed 24.    Current treatment plan:   Observation  Start Promacta if platelets drop <30K.    Chief Complaint: Results (No new complaints) and 3 mo f/u    HPI  Patient presents for telemedicine follow-up of ITP. He is dong very well. Platelets are 115K today. Denies any complaints. No new  problems reported.    History reviewed. No pertinent past medical history.   History reviewed. No pertinent surgical history.  Family History   Problem Relation Name Age of Onset    Diabetes Father Jimmy Morales     Heart disease Father Jimmy Morales     Diabetes Paternal Grandmother Mery Morales      Social History     Socioeconomic History    Marital status:    Tobacco Use    Smoking status: Never    Smokeless tobacco: Never   Substance and Sexual Activity    Alcohol use: Not Currently     Comment: occasionally    Drug use: Never    Sexual activity: Yes     Partners: Female     Birth control/protection: None     Social Drivers of Health     Financial Resource Strain: Low Risk  (4/20/2024)    Overall Financial Resource Strain (CARDIA)     Difficulty of Paying Living Expenses: Not very hard   Food Insecurity: No Food Insecurity (4/20/2024)    Hunger Vital Sign     Worried About Running Out of Food in the Last Year: Never true     Ran Out of Food in the Last Year: Never true   Transportation Needs: No Transportation Needs (4/20/2024)    PRAPARE - Transportation     Lack of Transportation (Medical): No     Lack of Transportation (Non-Medical): No   Stress: No Stress Concern Present (4/20/2024)    [employer] [place] [employer] - Occupational Stress Questionnaire     Feeling of Stress : Not at all   Housing Stability: Low Risk  (4/20/2024)    Housing Stability Vital Sign     Unable to Pay for Housing in the Last Year: No     Homeless in the Last Year: No       Review of patient's allergies indicates:  No Known Allergies   Current Outpatient Medications on File Prior to Visit   Medication Sig Dispense Refill    FLUoxetine 20 MG capsule       omeprazole (PRILOSEC) 40 MG capsule Take 40 mg by mouth every morning.      propranoloL (INDERAL) 40 MG tablet Take 20 mg by mouth as needed.      [DISCONTINUED] eltrombopag olamine (PROMACTA) 50 MG Tab Take 1 tablet (50 mg total) by mouth once daily.  (Patient not taking: Reported on 10/2/2024) 30 tablet 5    [DISCONTINUED] ergocalciferol (ERGOCALCIFEROL) 50,000 unit Cap Take 50,000 Units by mouth every 7 days.       No current facility-administered medications on file prior to visit.      Review of Systems   Constitutional:  Negative for appetite change and unexpected weight change.   HENT:  Negative for mouth sores.    Eyes:  Negative for visual disturbance.   Respiratory:  Negative for cough and shortness of breath.    Cardiovascular:  Negative for chest pain.   Gastrointestinal:  Negative for abdominal pain and diarrhea.   Genitourinary:  Negative for frequency.   Musculoskeletal:  Negative for back pain.   Integumentary:  Negative for rash.   Neurological:  Negative for headaches.   Hematological:  Negative for adenopathy.   Psychiatric/Behavioral:  The patient is not nervous/anxious.          There were no vitals filed for this visit.    Wt Readings from Last 3 Encounters:   10/02/24 1450 92.2 kg (203 lb 4.8 oz)   07/25/24 0908 90.8 kg (200 lb 3.2 oz)   06/26/24 1249 86.8 kg (191 lb 4.8 oz)     Physical Exam  Vitals reviewed.   Constitutional:       Appearance: Normal appearance.   HENT:      Head: Normocephalic.   Eyes:      Extraocular Movements: Extraocular movements intact.   Pulmonary:      Effort: Pulmonary effort is normal.   Musculoskeletal:      Cervical back: Neck supple.   Neurological:      Mental Status: He is alert and oriented to person, place, and time.   Psychiatric:         Mood and Affect: Mood normal.         Thought Content: Thought content normal.         Judgment: Judgment normal.       Lab Visit on 01/14/2025   Component Date Value Ref Range Status    WBC 01/14/2025 5.56  4.50 - 11.50 x10(3)/mcL Final    RBC 01/14/2025 5.31  4.70 - 6.10 x10(6)/mcL Final    Hgb 01/14/2025 15.7  14.0 - 18.0 g/dL Final    Hct 01/14/2025 46.0  42.0 - 52.0 % Final    MCV 01/14/2025 86.6  80.0 - 94.0 fL Final    MCH 01/14/2025 29.6  27.0 - 31.0 pg Final     MCHC 01/14/2025 34.1  33.0 - 36.0 g/dL Final    RDW 01/14/2025 12.0  11.5 - 17.0 % Final    Platelet 01/14/2025 115 (L)  130 - 400 x10(3)/mcL Final    MPV 01/14/2025 9.0  7.4 - 10.4 fL Final    Neut % 01/14/2025 48.5  % Final    Lymph % 01/14/2025 35.1  % Final    Mono % 01/14/2025 11.9  % Final    Eos % 01/14/2025 2.7  % Final    Basophil % 01/14/2025 1.1  % Final    Imm Grans % 01/14/2025 0.7  % Final    Neut # 01/14/2025 2.70  2.1 - 9.2 x10(3)/mcL Final    Lymph # 01/14/2025 1.95  0.6 - 4.6 x10(3)/mcL Final    Mono # 01/14/2025 0.66  0.1 - 1.3 x10(3)/mcL Final    Eos # 01/14/2025 0.15  0 - 0.9 x10(3)/mcL Final    Baso # 01/14/2025 0.06  <=0.2 x10(3)/mcL Final    Imm Gran # 01/14/2025 0.04  0.00 - 0.04 x10(3)/mcL Final    NRBC% 01/14/2025 0.0  % Final         Assessment:       1. Idiopathic thrombocytopenic purpura (ITP)        Plan:       Patient with ITP, diagnosed 4/2024.  S/p IVIG X 2 days and 4 days of IV decadron 40mg completed 4/21/24.     Recent labs with platelets 115,000 which is improved.   CBC is otherwise good.   Promacta was ordered and insurance approved but he has not needed to start since his labs have been improving. He has the medication at home if needed.   Plan to start Promacta if platelets drop <30K.   Follow-up in 3 months after CBC obtained, can be virtual.     CT C/A/P from 4/19/24 showed non-specific axillary adenopathy, but otherwise normal.   He had another CTA chest at [facility] on 4/27/24 when he went back to ED for chest pain. They mentioned again the right axillary enlarged nodes, measuring up to 3cm.  Patient referred Dr. Sonu Lozano for biopsy, core needle was obtained in the office on 5/14/24. Pathology showed no monotypic B-cell population, phenotypically abnormal T-cell population or blast cell population detected.   Follow-up US of the right axilla on 5/28/24 revealed the 3 cm ovoid hypoechoic focus with central increased echogenicity suspicious for a prominent partially fatty  lymph node.   No plans for any further testing since the lymph node was benign.     All questions answered at this time.    This is a telemedicine note. Patient was treated using telemedicine, realtime audio and video, according to [facility] protocol. I, distant provider, conducted the visit from [facility]. The patient participated in the visit at a non-[facility] location selected by the patient, identified in his vehicle. I am licensed in the state where the patient stated he was located. The patient stated that he understood and accepted the privacy and security risks to their information at their location.    Total time 10 min    BHAVANI Liang

## 2025-01-14 ENCOUNTER — OFFICE VISIT (OUTPATIENT)
Dept: HEMATOLOGY/ONCOLOGY | Facility: CLINIC | Age: 50
End: 2025-01-14
Payer: COMMERCIAL

## 2025-01-14 ENCOUNTER — LAB VISIT (OUTPATIENT)
Dept: LAB | Facility: HOSPITAL | Age: 50
End: 2025-01-14
Attending: INTERNAL MEDICINE
Payer: COMMERCIAL

## 2025-01-14 DIAGNOSIS — D69.3 IDIOPATHIC THROMBOCYTOPENIC PURPURA (ITP): ICD-10-CM

## 2025-01-14 DIAGNOSIS — D69.3 IDIOPATHIC THROMBOCYTOPENIC PURPURA (ITP): Primary | ICD-10-CM

## 2025-01-14 LAB
BASOPHILS # BLD AUTO: 0.06 X10(3)/MCL
BASOPHILS NFR BLD AUTO: 1.1 %
EOSINOPHIL # BLD AUTO: 0.15 X10(3)/MCL (ref 0–0.9)
EOSINOPHIL NFR BLD AUTO: 2.7 %
ERYTHROCYTE [DISTWIDTH] IN BLOOD BY AUTOMATED COUNT: 12 % (ref 11.5–17)
HCT VFR BLD AUTO: 46 % (ref 42–52)
HGB BLD-MCNC: 15.7 G/DL (ref 14–18)
IMM GRANULOCYTES # BLD AUTO: 0.04 X10(3)/MCL (ref 0–0.04)
IMM GRANULOCYTES NFR BLD AUTO: 0.7 %
LYMPHOCYTES # BLD AUTO: 1.95 X10(3)/MCL (ref 0.6–4.6)
LYMPHOCYTES NFR BLD AUTO: 35.1 %
MCH RBC QN AUTO: 29.6 PG (ref 27–31)
MCHC RBC AUTO-ENTMCNC: 34.1 G/DL (ref 33–36)
MCV RBC AUTO: 86.6 FL (ref 80–94)
MONOCYTES # BLD AUTO: 0.66 X10(3)/MCL (ref 0.1–1.3)
MONOCYTES NFR BLD AUTO: 11.9 %
NEUTROPHILS # BLD AUTO: 2.7 X10(3)/MCL (ref 2.1–9.2)
NEUTROPHILS NFR BLD AUTO: 48.5 %
NRBC BLD AUTO-RTO: 0 %
PLATELET # BLD AUTO: 115 X10(3)/MCL (ref 130–400)
PMV BLD AUTO: 9 FL (ref 7.4–10.4)
RBC # BLD AUTO: 5.31 X10(6)/MCL (ref 4.7–6.1)
WBC # BLD AUTO: 5.56 X10(3)/MCL (ref 4.5–11.5)

## 2025-01-14 PROCEDURE — 1160F RVW MEDS BY RX/DR IN RCRD: CPT | Mod: CPTII,95,, | Performed by: NURSE PRACTITIONER

## 2025-01-14 PROCEDURE — 85025 COMPLETE CBC W/AUTO DIFF WBC: CPT

## 2025-01-14 PROCEDURE — 98004 SYNCH AUDIO-VIDEO EST SF 10: CPT | Mod: 95,,, | Performed by: NURSE PRACTITIONER

## 2025-01-14 PROCEDURE — 1159F MED LIST DOCD IN RCRD: CPT | Mod: CPTII,95,, | Performed by: NURSE PRACTITIONER

## 2025-01-14 PROCEDURE — 36415 COLL VENOUS BLD VENIPUNCTURE: CPT

## 2025-02-28 ENCOUNTER — LAB VISIT (OUTPATIENT)
Dept: LAB | Facility: HOSPITAL | Age: 50
End: 2025-02-28
Attending: FAMILY MEDICINE
Payer: COMMERCIAL

## 2025-02-28 DIAGNOSIS — D69.3 IMMUNE THROMBOCYTOPENIC PURPURA: Primary | ICD-10-CM

## 2025-02-28 DIAGNOSIS — E78.2 MIXED HYPERLIPIDEMIA: ICD-10-CM

## 2025-02-28 DIAGNOSIS — E55.9 AVITAMINOSIS D: ICD-10-CM

## 2025-02-28 LAB
25(OH)D3+25(OH)D2 SERPL-MCNC: 35 NG/ML (ref 30–80)
ALBUMIN SERPL-MCNC: 4.2 G/DL (ref 3.5–5)
ALBUMIN/GLOB SERPL: 1.1 RATIO (ref 1.1–2)
ALP SERPL-CCNC: 81 UNIT/L (ref 40–150)
ALT SERPL-CCNC: 40 UNIT/L (ref 0–55)
ANION GAP SERPL CALC-SCNC: 7 MEQ/L
AST SERPL-CCNC: 25 UNIT/L (ref 5–34)
BASOPHILS # BLD AUTO: 0.06 X10(3)/MCL
BASOPHILS NFR BLD AUTO: 0.9 %
BILIRUB SERPL-MCNC: 0.6 MG/DL
BUN SERPL-MCNC: 15 MG/DL (ref 8.9–20.6)
CALCIUM SERPL-MCNC: 9.7 MG/DL (ref 8.4–10.2)
CHLORIDE SERPL-SCNC: 105 MMOL/L (ref 98–107)
CHOLEST SERPL-MCNC: 266 MG/DL
CHOLEST/HDLC SERPL: 7 {RATIO} (ref 0–5)
CO2 SERPL-SCNC: 27 MMOL/L (ref 22–29)
CREAT SERPL-MCNC: 1.12 MG/DL (ref 0.72–1.25)
CREAT/UREA NIT SERPL: 13
EOSINOPHIL # BLD AUTO: 0.16 X10(3)/MCL (ref 0–0.9)
EOSINOPHIL NFR BLD AUTO: 2.3 %
ERYTHROCYTE [DISTWIDTH] IN BLOOD BY AUTOMATED COUNT: 12 % (ref 11.5–17)
GFR SERPLBLD CREATININE-BSD FMLA CKD-EPI: >60 ML/MIN/1.73/M2
GLOBULIN SER-MCNC: 3.9 GM/DL (ref 2.4–3.5)
GLUCOSE SERPL-MCNC: 102 MG/DL (ref 74–100)
HCT VFR BLD AUTO: 46.4 % (ref 42–52)
HDLC SERPL-MCNC: 40 MG/DL (ref 35–60)
HGB BLD-MCNC: 16.2 G/DL (ref 14–18)
IMM GRANULOCYTES # BLD AUTO: 0.05 X10(3)/MCL (ref 0–0.04)
IMM GRANULOCYTES NFR BLD AUTO: 0.7 %
LDLC SERPL CALC-MCNC: 189 MG/DL (ref 50–140)
LYMPHOCYTES # BLD AUTO: 1.46 X10(3)/MCL (ref 0.6–4.6)
LYMPHOCYTES NFR BLD AUTO: 21.3 %
MCH RBC QN AUTO: 30.1 PG (ref 27–31)
MCHC RBC AUTO-ENTMCNC: 34.9 G/DL (ref 33–36)
MCV RBC AUTO: 86.2 FL (ref 80–94)
MICROCYTES BLD QL SMEAR: ABNORMAL
MONOCYTES # BLD AUTO: 0.58 X10(3)/MCL (ref 0.1–1.3)
MONOCYTES NFR BLD AUTO: 8.5 %
NEUTROPHILS # BLD AUTO: 4.53 X10(3)/MCL (ref 2.1–9.2)
NEUTROPHILS NFR BLD AUTO: 66.3 %
NRBC BLD AUTO-RTO: 0 %
OVALOCYTES (OLG): SLIGHT
PLATELET # BLD AUTO: 42 X10(3)/MCL (ref 130–400)
PLATELET # BLD EST: ABNORMAL 10*3/UL
PMV BLD AUTO: 11.9 FL (ref 7.4–10.4)
POTASSIUM SERPL-SCNC: 3.5 MMOL/L (ref 3.5–5.1)
PROT SERPL-MCNC: 8.1 GM/DL (ref 6.4–8.3)
RBC # BLD AUTO: 5.38 X10(6)/MCL (ref 4.7–6.1)
SODIUM SERPL-SCNC: 139 MMOL/L (ref 136–145)
TRIGL SERPL-MCNC: 184 MG/DL (ref 34–140)
VLDLC SERPL CALC-MCNC: 37 MG/DL
WBC # BLD AUTO: 6.84 X10(3)/MCL (ref 4.5–11.5)

## 2025-02-28 PROCEDURE — 85025 COMPLETE CBC W/AUTO DIFF WBC: CPT

## 2025-02-28 PROCEDURE — 36415 COLL VENOUS BLD VENIPUNCTURE: CPT

## 2025-02-28 PROCEDURE — 80061 LIPID PANEL: CPT

## 2025-02-28 PROCEDURE — 82306 VITAMIN D 25 HYDROXY: CPT

## 2025-02-28 PROCEDURE — 80053 COMPREHEN METABOLIC PANEL: CPT

## 2025-04-09 NOTE — PROGRESS NOTES
Subjective:       Patient ID: Darian Morales is a 49 y.o. male.    ITP--Diagnosed 24    Work-up:  24--peripheral smear with severe thrombocytopenia, occasional giant forms, no schistocytes or clumps; vitamin B12 371, folate 9.2, normal PT and PTT, fibrinogen normal, GAYLE negative, hepatitis panel and HIV negative, Platelet IgM strongly positive.  S/p Right axillary LN core needle biopsy on 24--No monotypic B-cell population, phenotypically abnormal T-cell population or blast cell population detected.     Imagin. CT C/A/P 24--Nonspecific lymphadenopathy seen in the axillary regions bilaterally, mild hepatomegaly and some hepatic steatosis.  2. CT brain w/o contrast 24--No acute intracranial abnormality identified.   3. CTA chest done at [facility] 24--enlarged right axillary nodes, with largest measuring 3cm, left axilla is unremarkable.  4. CT soft tissue neck w/ and w/o contrast done 5/3/24--No convincing acute abnormality.  Focal right parapharyngeal calcification is nonspecific, differential includes sialolithiasis versus sequela of chronic tonsillar inflammation.  5. US abdomen/liver done 5/3/24--Hyperechoic appearance of the liver is suggestive of steatosis, otherwise, unremarkable sonographic evaluation.  6. Esophagram on 24--Small hiatus hernia with Schatzki ring and to and fro motion of contrast within the esophagus.    Platelets:  24--70  24--86  24--55  24--83  24--54  24--97  24--74  10/02/24--63  24--90  25--115  25--7  04/15/25--38    Treatment history:  Platelet transfusion 24.  Decadron 40mg IV X 4 days completed 24 + IVIG X 2 days completed 24.    Current treatment plan:   Decadron 40mg oral x 4 days. Started on 25.   Start Promacta if platelets drop <30K.    Chief Complaint: Other Misc (Pt reports no concerns today./)    HPI  Patient presents for follow-up of ITP. He is dong very well.  He had routine labs yesterday that revealed platelet count of 7,000. He was called and started on Prednisone 40mg daily x 4 days. At the time, he denied any bleeding, bruising or petechiae but he does notice some to his lower extremities today. His labs show platelets have improved up to 38,000 today. He denies any changes in his medication or recent infections. We discussed resuming the weekly labs and he is in agreement. Appetite good and weight stable. Bowels moving well.     History reviewed. No pertinent past medical history.   History reviewed. No pertinent surgical history.  Family History   Problem Relation Name Age of Onset    Diabetes Father Jimmy Morales     Heart disease Father Jimmy Morales     Diabetes Paternal Grandmother Mery Morales      Social History     Socioeconomic History    Marital status:    Tobacco Use    Smoking status: Never    Smokeless tobacco: Never   Substance and Sexual Activity    Alcohol use: Not Currently     Comment: occasionally    Drug use: Never    Sexual activity: Yes     Partners: Female     Birth control/protection: None     Social Drivers of Health     Financial Resource Strain: Low Risk  (4/20/2024)    Overall Financial Resource Strain (CARDIA)     Difficulty of Paying Living Expenses: Not very hard   Food Insecurity: No Food Insecurity (4/20/2024)    Hunger Vital Sign     Worried About Running Out of Food in the Last Year: Never true     Ran Out of Food in the Last Year: Never true   Transportation Needs: No Transportation Needs (4/20/2024)    PRAPARE - Transportation     Lack of Transportation (Medical): No     Lack of Transportation (Non-Medical): No   Stress: No Stress Concern Present (4/20/2024)    [place] [place] [employer] - Occupational Stress Questionnaire     Feeling of Stress : Not at all   Housing Stability: Low Risk  (4/20/2024)    Housing Stability Vital Sign     Unable to Pay for Housing in the Last Year: No     Homeless in the  "Last Year: No       Review of patient's allergies indicates:  No Known Allergies   Current Outpatient Medications on File Prior to Visit   Medication Sig Dispense Refill    dexAMETHasone (DECADRON) 4 MG Tab Take 10 tablets (40mg) by mouth daily x 4 days. 40 tablet 0    FLUoxetine 20 MG capsule       omeprazole (PRILOSEC) 40 MG capsule Take 40 mg by mouth every morning.      eltrombopag olamine (PROMACTA) 50 MG Tab Take 50 mg by mouth once daily. (Patient not taking: Reported on 4/15/2025)      pantoprazole (PROTONIX) 40 MG tablet Take 1 tablet (40 mg total) by mouth once daily. (Patient not taking: Reported on 4/15/2025) 30 tablet 1     No current facility-administered medications on file prior to visit.      Review of Systems   Constitutional:  Negative for appetite change and unexpected weight change.   HENT:  Negative for mouth sores.    Eyes:  Negative for visual disturbance.   Respiratory:  Negative for cough and shortness of breath.    Cardiovascular:  Negative for chest pain.   Gastrointestinal:  Negative for abdominal pain and diarrhea.   Genitourinary:  Negative for frequency.   Musculoskeletal:  Negative for back pain.   Integumentary:  Negative for rash.   Neurological:  Negative for headaches.   Hematological:  Negative for adenopathy. Bruises/bleeds easily (petechiae to bilateral lower extremities).   Psychiatric/Behavioral:  The patient is not nervous/anxious.          Vitals:    04/15/25 1053   BP: (!) 142/97   Pulse: 93   Resp: 14   Temp: 98 °F (36.7 °C)   TempSrc: Oral   SpO2: 97%   Weight: 96.5 kg (212 lb 11.2 oz)   Height: 5' 7" (1.702 m)     Wt Readings from Last 3 Encounters:   04/15/25 1053 96.5 kg (212 lb 11.2 oz)   10/02/24 1450 92.2 kg (203 lb 4.8 oz)   07/25/24 0908 90.8 kg (200 lb 3.2 oz)     Physical Exam  Constitutional:       General: He is awake.      Appearance: Normal appearance. He is overweight.   HENT:      Head: Normocephalic.   Eyes:      General: Lids are normal. Vision grossly " intact.      Extraocular Movements: Extraocular movements intact.      Conjunctiva/sclera: Conjunctivae normal.   Cardiovascular:      Rate and Rhythm: Normal rate and regular rhythm.      Pulses: Normal pulses.      Heart sounds: Normal heart sounds.   Pulmonary:      Effort: Pulmonary effort is normal.      Breath sounds: Normal breath sounds and air entry.   Abdominal:      General: Abdomen is flat. Bowel sounds are normal.      Palpations: Abdomen is soft.   Musculoskeletal:      Cervical back: Normal range of motion and neck supple.   Skin:     General: Skin is warm and moist.      Findings: Petechiae present.      Comments: Mild, to bilateral lower extremities.   Neurological:      General: No focal deficit present.      Mental Status: He is alert and oriented to person, place, and time.   Psychiatric:         Attention and Perception: Attention normal.         Mood and Affect: Mood normal.         Speech: Speech normal.         Behavior: Behavior is cooperative.         Thought Content: Thought content normal.         Cognition and Memory: Cognition normal.         Judgment: Judgment normal.       Lab Visit on 04/15/2025   Component Date Value Ref Range Status    WBC 04/15/2025 17.72 (H)  4.50 - 11.50 x10(3)/mcL Final    RBC 04/15/2025 5.32  4.70 - 6.10 x10(6)/mcL Final    Hgb 04/15/2025 16.1  14.0 - 18.0 g/dL Final    Hct 04/15/2025 45.1  42.0 - 52.0 % Final    MCV 04/15/2025 84.8  80.0 - 94.0 fL Final    MCH 04/15/2025 30.3  27.0 - 31.0 pg Final    MCHC 04/15/2025 35.7  33.0 - 36.0 g/dL Final    RDW 04/15/2025 11.7  11.5 - 17.0 % Final    Platelet 04/15/2025 38 (LL)  130 - 400 x10(3)/mcL Final    MPV 04/15/2025 10.9 (H)  7.4 - 10.4 fL Final    Neut % 04/15/2025 91.3  % Final    Lymph % 04/15/2025 5.7  % Final    Mono % 04/15/2025 2.3  % Final    Eos % 04/15/2025 0.0  % Final    Basophil % 04/15/2025 0.1  % Final    Imm Grans % 04/15/2025 0.6  % Final    Neut # 04/15/2025 16.19 (H)  2.1 - 9.2 x10(3)/mcL  Final    Lymph # 04/15/2025 1.01  0.6 - 4.6 x10(3)/mcL Final    Mono # 04/15/2025 0.40  0.1 - 1.3 x10(3)/mcL Final    Eos # 04/15/2025 0.00  0 - 0.9 x10(3)/mcL Final    Baso # 04/15/2025 0.02  <=0.2 x10(3)/mcL Final    Imm Gran # 04/15/2025 0.10 (H)  0.00 - 0.04 x10(3)/mcL Final   Lab Visit on 04/14/2025   Component Date Value Ref Range Status    WBC 04/14/2025 5.12  4.50 - 11.50 x10(3)/mcL Final    RBC 04/14/2025 5.37  4.70 - 6.10 x10(6)/mcL Final    Hgb 04/14/2025 15.8  14.0 - 18.0 g/dL Final    Hct 04/14/2025 45.4  42.0 - 52.0 % Final    MCV 04/14/2025 84.5  80.0 - 94.0 fL Final    MCH 04/14/2025 29.4  27.0 - 31.0 pg Final    MCHC 04/14/2025 34.8  33.0 - 36.0 g/dL Final    RDW 04/14/2025 11.9  11.5 - 17.0 % Final    Platelet 04/14/2025 7 (LL)  130 - 400 x10(3)/mcL Final    MPV 04/14/2025 15.2 (H)  7.4 - 10.4 fL Final    Neut % 04/14/2025 50.0  % Final    Lymph % 04/14/2025 33.0  % Final    Mono % 04/14/2025 11.9  % Final    Eos % 04/14/2025 2.7  % Final    Basophil % 04/14/2025 1.4  % Final    Imm Grans % 04/14/2025 1.0  % Final    Neut # 04/14/2025 2.56  2.1 - 9.2 x10(3)/mcL Final    Lymph # 04/14/2025 1.69  0.6 - 4.6 x10(3)/mcL Final    Mono # 04/14/2025 0.61  0.1 - 1.3 x10(3)/mcL Final    Eos # 04/14/2025 0.14  0 - 0.9 x10(3)/mcL Final    Baso # 04/14/2025 0.07  <=0.2 x10(3)/mcL Final    Imm Gran # 04/14/2025 0.05 (H)  0.00 - 0.04 x10(3)/mcL Final    NRBC% 04/14/2025 0.0  % Final         Assessment:       1. Idiopathic thrombocytopenic purpura (ITP)        Plan:       Patient with ITP, diagnosed 4/2024.  S/p IVIG X 2 days and 4 days of IV decadron 40mg completed 4/21/24.     CT C/A/P from 4/19/24 showed non-specific axillary adenopathy, but otherwise normal.   He had another CTA chest at [facility] on 4/27/24 when he went back to ED for chest pain. They mentioned again the right axillary enlarged nodes, measuring up to 3cm.  Patient referred Dr. Sonu Lozano for biopsy, core needle was obtained in the office on  5/14/24. Pathology showed no monotypic B-cell population, phenotypically abnormal T-cell population or blast cell population detected.   Follow-up US of the right axilla on 5/28/24 revealed the 3 cm ovoid hypoechoic focus with central increased echogenicity suspicious for a prominent partially fatty lymph node.   No plans for any further testing since the lymph node was benign.     CBC yesterday revealed platelet count of 7,000. Otherwise CBC was normal. Started Prednisone 40mg oral x 4 days.   CBC today with platelets 38,000. WBC elevated but likely from Prednisone.   Continue Prednisone and Prilosec.   Restart weekly CBC which he can do in [place].   Depending on labs next week, he may need to start Promacta. Promacta was ordered and insurance approved but he has not needed to start since his labs were improving. He has the medication at home if needed.   Plan to start Promacta if platelets drop <30K.   Follow-up in 3 weeks, can be virtual.     All questions answered at this time.      BHAVANI Liang      Visit today included increased complexity associated with the care of the longitudinal care of the patient's chronic ITP.

## 2025-04-14 ENCOUNTER — LAB VISIT (OUTPATIENT)
Dept: LAB | Facility: HOSPITAL | Age: 50
End: 2025-04-14
Attending: NURSE PRACTITIONER
Payer: COMMERCIAL

## 2025-04-14 ENCOUNTER — RESULTS FOLLOW-UP (OUTPATIENT)
Dept: HEMATOLOGY/ONCOLOGY | Facility: CLINIC | Age: 50
End: 2025-04-14
Payer: COMMERCIAL

## 2025-04-14 DIAGNOSIS — D69.3 IDIOPATHIC THROMBOCYTOPENIC PURPURA (ITP): Primary | ICD-10-CM

## 2025-04-14 DIAGNOSIS — D69.3 ACUTE IDIOPATHIC THROMBOCYTOPENIC PURPURA: ICD-10-CM

## 2025-04-14 DIAGNOSIS — D69.3 IDIOPATHIC THROMBOCYTOPENIC PURPURA (ITP): ICD-10-CM

## 2025-04-14 LAB
BASOPHILS # BLD AUTO: 0.07 X10(3)/MCL
BASOPHILS NFR BLD AUTO: 1.4 %
EOSINOPHIL # BLD AUTO: 0.14 X10(3)/MCL (ref 0–0.9)
EOSINOPHIL NFR BLD AUTO: 2.7 %
ERYTHROCYTE [DISTWIDTH] IN BLOOD BY AUTOMATED COUNT: 11.9 % (ref 11.5–17)
HCT VFR BLD AUTO: 45.4 % (ref 42–52)
HGB BLD-MCNC: 15.8 G/DL (ref 14–18)
IMM GRANULOCYTES # BLD AUTO: 0.05 X10(3)/MCL (ref 0–0.04)
IMM GRANULOCYTES NFR BLD AUTO: 1 %
LYMPHOCYTES # BLD AUTO: 1.69 X10(3)/MCL (ref 0.6–4.6)
LYMPHOCYTES NFR BLD AUTO: 33 %
MCH RBC QN AUTO: 29.4 PG (ref 27–31)
MCHC RBC AUTO-ENTMCNC: 34.8 G/DL (ref 33–36)
MCV RBC AUTO: 84.5 FL (ref 80–94)
MONOCYTES # BLD AUTO: 0.61 X10(3)/MCL (ref 0.1–1.3)
MONOCYTES NFR BLD AUTO: 11.9 %
NEUTROPHILS # BLD AUTO: 2.56 X10(3)/MCL (ref 2.1–9.2)
NEUTROPHILS NFR BLD AUTO: 50 %
NRBC BLD AUTO-RTO: 0 %
PLATELET # BLD AUTO: 7 X10(3)/MCL (ref 130–400)
PMV BLD AUTO: 15.2 FL (ref 7.4–10.4)
RBC # BLD AUTO: 5.37 X10(6)/MCL (ref 4.7–6.1)
WBC # BLD AUTO: 5.12 X10(3)/MCL (ref 4.5–11.5)

## 2025-04-14 PROCEDURE — 85025 COMPLETE CBC W/AUTO DIFF WBC: CPT

## 2025-04-14 PROCEDURE — 36415 COLL VENOUS BLD VENIPUNCTURE: CPT

## 2025-04-14 RX ORDER — PANTOPRAZOLE SODIUM 40 MG/1
40 TABLET, DELAYED RELEASE ORAL DAILY
Qty: 30 TABLET | Refills: 1 | Status: SHIPPED | OUTPATIENT
Start: 2025-04-14 | End: 2026-04-14

## 2025-04-14 RX ORDER — DEXAMETHASONE 4 MG/1
TABLET ORAL
Qty: 40 TABLET | Refills: 0 | Status: SHIPPED | OUTPATIENT
Start: 2025-04-14

## 2025-04-14 NOTE — PROGRESS NOTES
Routine labs drawn in [place] with critical platelet count of 7,000. Called and spoke with patient who is current asymptomatic with no bleeding, bruising or petechiae. Dr. Kuhn notified and recommends starting Decadron 40mg oral x 4 days today. Will repeat CBC again tomorrow prior to his appointment to see if he will require transfusion. Please add CBC lab visit for tomorrow and change his appointment from virtual to in-person visit. He will need Protonix 40mg daily as well. I will send both medications to his pharmacy. He was called and notified of the plan of care.

## 2025-04-15 ENCOUNTER — OFFICE VISIT (OUTPATIENT)
Dept: HEMATOLOGY/ONCOLOGY | Facility: CLINIC | Age: 50
End: 2025-04-15
Payer: COMMERCIAL

## 2025-04-15 ENCOUNTER — LAB VISIT (OUTPATIENT)
Dept: LAB | Facility: HOSPITAL | Age: 50
End: 2025-04-15
Attending: INTERNAL MEDICINE
Payer: COMMERCIAL

## 2025-04-15 VITALS
HEART RATE: 93 BPM | OXYGEN SATURATION: 97 % | SYSTOLIC BLOOD PRESSURE: 142 MMHG | WEIGHT: 212.69 LBS | BODY MASS INDEX: 33.38 KG/M2 | RESPIRATION RATE: 14 BRPM | TEMPERATURE: 98 F | HEIGHT: 67 IN | DIASTOLIC BLOOD PRESSURE: 97 MMHG

## 2025-04-15 DIAGNOSIS — D69.3 IDIOPATHIC THROMBOCYTOPENIC PURPURA (ITP): Primary | ICD-10-CM

## 2025-04-15 DIAGNOSIS — D69.3 IDIOPATHIC THROMBOCYTOPENIC PURPURA (ITP): ICD-10-CM

## 2025-04-15 DIAGNOSIS — D69.3 ACUTE IDIOPATHIC THROMBOCYTOPENIC PURPURA: ICD-10-CM

## 2025-04-15 LAB
BASOPHILS # BLD AUTO: 0.02 X10(3)/MCL
BASOPHILS NFR BLD AUTO: 0.1 %
EOSINOPHIL # BLD AUTO: 0 X10(3)/MCL (ref 0–0.9)
EOSINOPHIL NFR BLD AUTO: 0 %
ERYTHROCYTE [DISTWIDTH] IN BLOOD BY AUTOMATED COUNT: 11.7 % (ref 11.5–17)
HCT VFR BLD AUTO: 45.1 % (ref 42–52)
HGB BLD-MCNC: 16.1 G/DL (ref 14–18)
IMM GRANULOCYTES # BLD AUTO: 0.1 X10(3)/MCL (ref 0–0.04)
IMM GRANULOCYTES NFR BLD AUTO: 0.6 %
LYMPHOCYTES # BLD AUTO: 1.01 X10(3)/MCL (ref 0.6–4.6)
LYMPHOCYTES NFR BLD AUTO: 5.7 %
MCH RBC QN AUTO: 30.3 PG (ref 27–31)
MCHC RBC AUTO-ENTMCNC: 35.7 G/DL (ref 33–36)
MCV RBC AUTO: 84.8 FL (ref 80–94)
MONOCYTES # BLD AUTO: 0.4 X10(3)/MCL (ref 0.1–1.3)
MONOCYTES NFR BLD AUTO: 2.3 %
NEUTROPHILS # BLD AUTO: 16.19 X10(3)/MCL (ref 2.1–9.2)
NEUTROPHILS NFR BLD AUTO: 91.3 %
PLATELET # BLD AUTO: 38 X10(3)/MCL (ref 130–400)
PMV BLD AUTO: 10.9 FL (ref 7.4–10.4)
RBC # BLD AUTO: 5.32 X10(6)/MCL (ref 4.7–6.1)
WBC # BLD AUTO: 17.72 X10(3)/MCL (ref 4.5–11.5)

## 2025-04-15 PROCEDURE — 3077F SYST BP >= 140 MM HG: CPT | Mod: CPTII,S$GLB,, | Performed by: NURSE PRACTITIONER

## 2025-04-15 PROCEDURE — G2211 COMPLEX E/M VISIT ADD ON: HCPCS | Mod: S$GLB,,, | Performed by: NURSE PRACTITIONER

## 2025-04-15 PROCEDURE — 3008F BODY MASS INDEX DOCD: CPT | Mod: CPTII,S$GLB,, | Performed by: NURSE PRACTITIONER

## 2025-04-15 PROCEDURE — 85025 COMPLETE CBC W/AUTO DIFF WBC: CPT

## 2025-04-15 PROCEDURE — 1159F MED LIST DOCD IN RCRD: CPT | Mod: CPTII,S$GLB,, | Performed by: NURSE PRACTITIONER

## 2025-04-15 PROCEDURE — 3080F DIAST BP >= 90 MM HG: CPT | Mod: CPTII,S$GLB,, | Performed by: NURSE PRACTITIONER

## 2025-04-15 PROCEDURE — 36415 COLL VENOUS BLD VENIPUNCTURE: CPT

## 2025-04-15 PROCEDURE — 99999 PR PBB SHADOW E&M-EST. PATIENT-LVL IV: CPT | Mod: PBBFAC,,, | Performed by: NURSE PRACTITIONER

## 2025-04-15 PROCEDURE — 1160F RVW MEDS BY RX/DR IN RCRD: CPT | Mod: CPTII,S$GLB,, | Performed by: NURSE PRACTITIONER

## 2025-04-15 PROCEDURE — 99214 OFFICE O/P EST MOD 30 MIN: CPT | Mod: S$GLB,,, | Performed by: NURSE PRACTITIONER

## 2025-04-21 ENCOUNTER — RESULTS FOLLOW-UP (OUTPATIENT)
Dept: HEMATOLOGY/ONCOLOGY | Facility: CLINIC | Age: 50
End: 2025-04-21

## 2025-04-21 ENCOUNTER — LAB VISIT (OUTPATIENT)
Dept: LAB | Facility: HOSPITAL | Age: 50
End: 2025-04-21
Attending: NURSE PRACTITIONER
Payer: COMMERCIAL

## 2025-04-21 DIAGNOSIS — D69.3 IDIOPATHIC THROMBOCYTOPENIC PURPURA (ITP): ICD-10-CM

## 2025-04-21 LAB
BASOPHILS # BLD AUTO: 0.08 X10(3)/MCL
BASOPHILS NFR BLD AUTO: 0.9 %
EOSINOPHIL # BLD AUTO: 0.23 X10(3)/MCL (ref 0–0.9)
EOSINOPHIL NFR BLD AUTO: 2.7 %
ERYTHROCYTE [DISTWIDTH] IN BLOOD BY AUTOMATED COUNT: 12.3 % (ref 11.5–17)
HCT VFR BLD AUTO: 47.2 % (ref 42–52)
HGB BLD-MCNC: 16.8 G/DL (ref 14–18)
IMM GRANULOCYTES # BLD AUTO: 0.39 X10(3)/MCL (ref 0–0.04)
IMM GRANULOCYTES NFR BLD AUTO: 4.5 %
LYMPHOCYTES # BLD AUTO: 2.58 X10(3)/MCL (ref 0.6–4.6)
LYMPHOCYTES NFR BLD AUTO: 29.9 %
MCH RBC QN AUTO: 30.1 PG (ref 27–31)
MCHC RBC AUTO-ENTMCNC: 35.6 G/DL (ref 33–36)
MCV RBC AUTO: 84.6 FL (ref 80–94)
MONOCYTES # BLD AUTO: 0.95 X10(3)/MCL (ref 0.1–1.3)
MONOCYTES NFR BLD AUTO: 11 %
NEUTROPHILS # BLD AUTO: 4.41 X10(3)/MCL (ref 2.1–9.2)
NEUTROPHILS NFR BLD AUTO: 51 %
NRBC BLD AUTO-RTO: 0 %
PLATELET # BLD AUTO: 63 X10(3)/MCL (ref 130–400)
PMV BLD AUTO: 10.8 FL (ref 7.4–10.4)
RBC # BLD AUTO: 5.58 X10(6)/MCL (ref 4.7–6.1)
WBC # BLD AUTO: 8.64 X10(3)/MCL (ref 4.5–11.5)

## 2025-04-21 PROCEDURE — 85025 COMPLETE CBC W/AUTO DIFF WBC: CPT

## 2025-04-21 PROCEDURE — 36415 COLL VENOUS BLD VENIPUNCTURE: CPT

## 2025-04-21 NOTE — PROGRESS NOTES
Routine weekly labs today reveal platelets are 63,000. Dr. Kuhn aware and wants to hold off on starting Promacta once platelets <50,000. Patient called and notified and in agreement with the plan. He will repeat labs again on Monday or sooner if he develops any bleeding, bruising or petechiae.

## 2025-04-28 ENCOUNTER — TELEPHONE (OUTPATIENT)
Dept: HEMATOLOGY/ONCOLOGY | Facility: CLINIC | Age: 50
End: 2025-04-28
Payer: COMMERCIAL

## 2025-04-28 ENCOUNTER — LAB VISIT (OUTPATIENT)
Dept: LAB | Facility: HOSPITAL | Age: 50
End: 2025-04-28
Attending: NURSE PRACTITIONER
Payer: COMMERCIAL

## 2025-04-28 DIAGNOSIS — D69.3 IDIOPATHIC THROMBOCYTOPENIC PURPURA (ITP): ICD-10-CM

## 2025-04-28 LAB
BASOPHILS # BLD AUTO: 0.05 X10(3)/MCL
BASOPHILS NFR BLD AUTO: 0.7 %
EOSINOPHIL # BLD AUTO: 0.12 X10(3)/MCL (ref 0–0.9)
EOSINOPHIL NFR BLD AUTO: 1.7 %
ERYTHROCYTE [DISTWIDTH] IN BLOOD BY AUTOMATED COUNT: 12.1 % (ref 11.5–17)
HCT VFR BLD AUTO: 44.7 % (ref 42–52)
HGB BLD-MCNC: 15.4 G/DL (ref 14–18)
IMM GRANULOCYTES # BLD AUTO: 0.05 X10(3)/MCL (ref 0–0.04)
IMM GRANULOCYTES NFR BLD AUTO: 0.7 %
LYMPHOCYTES # BLD AUTO: 1.61 X10(3)/MCL (ref 0.6–4.6)
LYMPHOCYTES NFR BLD AUTO: 23.2 %
MCH RBC QN AUTO: 29.7 PG (ref 27–31)
MCHC RBC AUTO-ENTMCNC: 34.5 G/DL (ref 33–36)
MCV RBC AUTO: 86.3 FL (ref 80–94)
MONOCYTES # BLD AUTO: 0.71 X10(3)/MCL (ref 0.1–1.3)
MONOCYTES NFR BLD AUTO: 10.2 %
NEUTROPHILS # BLD AUTO: 4.4 X10(3)/MCL (ref 2.1–9.2)
NEUTROPHILS NFR BLD AUTO: 63.5 %
NRBC BLD AUTO-RTO: 0 %
PLATELET # BLD AUTO: 37 X10(3)/MCL (ref 130–400)
PMV BLD AUTO: 10.9 FL (ref 7.4–10.4)
RBC # BLD AUTO: 5.18 X10(6)/MCL (ref 4.7–6.1)
WBC # BLD AUTO: 6.94 X10(3)/MCL (ref 4.5–11.5)

## 2025-04-28 PROCEDURE — 36415 COLL VENOUS BLD VENIPUNCTURE: CPT

## 2025-04-28 PROCEDURE — 85025 COMPLETE CBC W/AUTO DIFF WBC: CPT

## 2025-04-28 NOTE — TELEPHONE ENCOUNTER
Can you call and let him know that his platelets have dropped <50,000 and he will need to start the Promacta. He has some at home but has never needed to start.   Keren, he may need a refresher on the dietary restrictions when taking. I did discuss this with him when I saw him and he was considering taking right before bed in order to avoid taking with high calcium meal.

## 2025-04-30 DIAGNOSIS — D69.3 IDIOPATHIC THROMBOCYTOPENIC PURPURA (ITP): Primary | ICD-10-CM

## 2025-05-01 NOTE — PROGRESS NOTES
Subjective:       Patient ID: Darian Morales is a 49 y.o. male.    ITP--Diagnosed 24    Work-up:  24--peripheral smear with severe thrombocytopenia, occasional giant forms, no schistocytes or clumps; vitamin B12 371, folate 9.2, normal PT and PTT, fibrinogen normal, GAYLE negative, hepatitis panel and HIV negative, Platelet IgM strongly positive.  S/p Right axillary LN core needle biopsy on 24--No monotypic B-cell population, phenotypically abnormal T-cell population or blast cell population detected.     Imagin. CT C/A/P 24--Nonspecific lymphadenopathy seen in the axillary regions bilaterally, mild hepatomegaly and some hepatic steatosis.  2. CT brain w/o contrast 24--No acute intracranial abnormality identified.   3. CTA chest done at [facility] 24--enlarged right axillary nodes, with largest measuring 3cm, left axilla is unremarkable.  4. CT soft tissue neck w/ and w/o contrast done 5/3/24--No convincing acute abnormality.  Focal right parapharyngeal calcification is nonspecific, differential includes sialolithiasis versus sequela of chronic tonsillar inflammation.  5. US abdomen/liver done 5/3/24--Hyperechoic appearance of the liver is suggestive of steatosis, otherwise, unremarkable sonographic evaluation.  6. Esophagram on 24--Small hiatus hernia with Schatzki ring and to and fro motion of contrast within the esophagus.    Platelets:  24--70  24--86  24--55  24--83  24--54  24--97  24--74  10/02/24--63  24--90  25--115  25--7  04/15/25--38    Treatment history:  Platelet transfusion 24.  Decadron 40mg IV X 4 days completed 24 + IVIG X 2 days completed 24.  Recurrence Decadron 40mg oral daily X 4 days completed 25.    Current treatment plan:   Promacta started 25.    Chief Complaint: Other Misc (Pt reports no concerns today./)    HPI  Patient presents for follow-up of ITP via telemedicine visit.  He completed his course of Dexamethasone and platelets improved from 7 to 63, then dropped again the next week to 37. Promacta started daily on 4/28/25 and he is tolerating well. Platelets much improved, normal at 245. Denies any complaints or side effects from the medication.     History reviewed. No pertinent past medical history.   History reviewed. No pertinent surgical history.  Family History   Problem Relation Name Age of Onset    Diabetes Father Jimmy Morales     Heart disease Father Jimmy Morales     Diabetes Paternal Grandmother Mery Morales      Social History     Socioeconomic History    Marital status:    Tobacco Use    Smoking status: Never    Smokeless tobacco: Never   Substance and Sexual Activity    Alcohol use: Not Currently     Comment: occasionally    Drug use: Never    Sexual activity: Yes     Partners: Female     Birth control/protection: None     Social Drivers of Health     Financial Resource Strain: Low Risk  (4/20/2024)    Overall Financial Resource Strain (CARDIA)     Difficulty of Paying Living Expenses: Not very hard   Food Insecurity: No Food Insecurity (4/20/2024)    Hunger Vital Sign     Worried About Running Out of Food in the Last Year: Never true     Ran Out of Food in the Last Year: Never true   Transportation Needs: No Transportation Needs (4/20/2024)    PRAPARE - Transportation     Lack of Transportation (Medical): No     Lack of Transportation (Non-Medical): No   Stress: No Stress Concern Present (4/20/2024)    [employer] - Occupational Stress Questionnaire     Feeling of Stress : Not at all   Housing Stability: Low Risk  (4/20/2024)    Housing Stability Vital Sign     Unable to Pay for Housing in the Last Year: No     Homeless in the Last Year: No       Review of patient's allergies indicates:  No Known Allergies   Current Outpatient Medications on File Prior to Visit   Medication Sig Dispense Refill    eltrombopag olamine (PROMACTA)  "50 MG Tab Take 1 tablet (50 mg total) by mouth once daily. Take on an empty stomach or with a low-calcium (<50 mg) meal 30 tablet 3    FLUoxetine 20 MG capsule       fluticasone propionate (FLONASE) 50 mcg/actuation nasal spray 1 spray by Each Nostril route.      omeprazole (PRILOSEC) 40 MG capsule Take 40 mg by mouth every morning.      dexAMETHasone (DECADRON) 4 MG Tab Take 10 tablets (40mg) by mouth daily x 4 days. (Patient not taking: Reported on 5/7/2025) 40 tablet 0    pantoprazole (PROTONIX) 40 MG tablet Take 1 tablet (40 mg total) by mouth once daily. (Patient not taking: Reported on 4/15/2025) 30 tablet 1     No current facility-administered medications on file prior to visit.      Review of Systems   Constitutional:  Negative for appetite change and unexpected weight change.   HENT:  Negative for mouth sores.    Eyes:  Negative for visual disturbance.   Respiratory:  Negative for cough and shortness of breath.    Cardiovascular:  Negative for chest pain.   Gastrointestinal:  Negative for abdominal pain and diarrhea.   Genitourinary:  Negative for frequency.   Musculoskeletal:  Negative for back pain.   Integumentary:  Negative for rash.   Neurological:  Negative for headaches.   Hematological:  Negative for adenopathy. Bruises/bleeds easily (petechiae to bilateral lower extremities).   Psychiatric/Behavioral:  The patient is not nervous/anxious.          Vitals:    05/07/25 0919   Weight: 95.3 kg (210 lb)   Height: 5' 7" (1.702 m)       Wt Readings from Last 3 Encounters:   05/07/25 0919 95.3 kg (210 lb)   04/15/25 1053 96.5 kg (212 lb 11.2 oz)   10/02/24 1450 92.2 kg (203 lb 4.8 oz)     Physical Exam  Constitutional:       General: He is awake.      Appearance: Normal appearance. He is overweight.   HENT:      Head: Normocephalic.      Nose: Nose normal.   Eyes:      General: Lids are normal. Vision grossly intact.      Extraocular Movements: Extraocular movements intact.   Pulmonary:      Effort: " Pulmonary effort is normal.      Breath sounds: Normal air entry.   Musculoskeletal:      Cervical back: Neck supple.   Skin:     General: Skin is moist.      Findings: No petechiae.   Neurological:      General: No focal deficit present.      Mental Status: He is alert and oriented to person, place, and time.   Psychiatric:         Attention and Perception: Attention normal.         Mood and Affect: Mood normal.         Speech: Speech normal.         Behavior: Behavior is cooperative.         Thought Content: Thought content normal.         Cognition and Memory: Cognition normal.         Judgment: Judgment normal.       Lab Visit on 05/06/2025   Component Date Value Ref Range Status    WBC 05/06/2025 5.74  4.50 - 11.50 x10(3)/mcL Final    RBC 05/06/2025 4.84  4.70 - 6.10 x10(6)/mcL Final    Hgb 05/06/2025 14.4  14.0 - 18.0 g/dL Final    Hct 05/06/2025 41.6 (L)  42.0 - 52.0 % Final    MCV 05/06/2025 86.0  80.0 - 94.0 fL Final    MCH 05/06/2025 29.8  27.0 - 31.0 pg Final    MCHC 05/06/2025 34.6  33.0 - 36.0 g/dL Final    RDW 05/06/2025 12.2  11.5 - 17.0 % Final    Platelet 05/06/2025 245  130 - 400 x10(3)/mcL Final    MPV 05/06/2025 9.3  7.4 - 10.4 fL Final    Neut % 05/06/2025 56.3  % Final    Lymph % 05/06/2025 28.7  % Final    Mono % 05/06/2025 10.1  % Final    Eos % 05/06/2025 3.7  % Final    Basophil % 05/06/2025 0.9  % Final    Imm Grans % 05/06/2025 0.3  % Final    Neut # 05/06/2025 3.23  2.1 - 9.2 x10(3)/mcL Final    Lymph # 05/06/2025 1.65  0.6 - 4.6 x10(3)/mcL Final    Mono # 05/06/2025 0.58  0.1 - 1.3 x10(3)/mcL Final    Eos # 05/06/2025 0.21  0 - 0.9 x10(3)/mcL Final    Baso # 05/06/2025 0.05  <=0.2 x10(3)/mcL Final    Imm Gran # 05/06/2025 0.02  0.00 - 0.04 x10(3)/mcL Final    NRBC% 05/06/2025 0.0  % Final         Assessment:       1. Idiopathic thrombocytopenic purpura (ITP)      Plan:       Patient with ITP, diagnosed 4/2024.  S/p IVIG X 2 days and 4 days of IV decadron 40mg completed 4/21/24.     CT  C/A/P from 4/19/24 showed non-specific axillary adenopathy, but otherwise normal.   He had another CTA chest at [facility] on 4/27/24 when he went back to ED for chest pain. They mentioned again the right axillary enlarged nodes, measuring up to 3cm.  Patient referred Dr. Sonu Lozano for biopsy, core needle was obtained in the office on 5/14/24. Pathology showed no monotypic B-cell population, phenotypically abnormal T-cell population or blast cell population detected.   Follow-up US of the right axilla on 5/28/24 revealed the 3 cm ovoid hypoechoic focus with central increased echogenicity suspicious for a prominent partially fatty lymph node.   No plans for any further testing since the lymph node was benign.     Patient with worsening of ITP 4/14/25 platelets 7K.  Completed course of Dexamethasone 40mg daily X 4 days on 4/17/25. Platelets improved to 63K but dropped again to 37K on 4/28/25.  Promacta 50mg daily started on 4/28/25.    Platelets much improved today 245K, normal. CBC otherwise normal.  Patient instructed to decrease Promacta to every other day.  Weekly labs.  F/u visit in 3 weeks.     All questions answered at this time.      Sheila Kuhn MD      This is a telemedicine note. Patient was treated using telemedicine, realtime audio and video, according to [facility] protocol. I, distant provider, conducted the visit from [facility]. The patient participated in the visit at a non-[facility] location selected by the patient, identified at his/her home. I am licensed in the state where the patient stated he or she was located. The patient stated that he/she understood and accepted the privacy and security risks to their information at their location.

## 2025-05-06 ENCOUNTER — LAB VISIT (OUTPATIENT)
Dept: LAB | Facility: HOSPITAL | Age: 50
End: 2025-05-06
Attending: NURSE PRACTITIONER
Payer: COMMERCIAL

## 2025-05-06 DIAGNOSIS — D69.3 IDIOPATHIC THROMBOCYTOPENIC PURPURA (ITP): ICD-10-CM

## 2025-05-06 LAB
BASOPHILS # BLD AUTO: 0.05 X10(3)/MCL
BASOPHILS NFR BLD AUTO: 0.9 %
EOSINOPHIL # BLD AUTO: 0.21 X10(3)/MCL (ref 0–0.9)
EOSINOPHIL NFR BLD AUTO: 3.7 %
ERYTHROCYTE [DISTWIDTH] IN BLOOD BY AUTOMATED COUNT: 12.2 % (ref 11.5–17)
HCT VFR BLD AUTO: 41.6 % (ref 42–52)
HGB BLD-MCNC: 14.4 G/DL (ref 14–18)
IMM GRANULOCYTES # BLD AUTO: 0.02 X10(3)/MCL (ref 0–0.04)
IMM GRANULOCYTES NFR BLD AUTO: 0.3 %
LYMPHOCYTES # BLD AUTO: 1.65 X10(3)/MCL (ref 0.6–4.6)
LYMPHOCYTES NFR BLD AUTO: 28.7 %
MCH RBC QN AUTO: 29.8 PG (ref 27–31)
MCHC RBC AUTO-ENTMCNC: 34.6 G/DL (ref 33–36)
MCV RBC AUTO: 86 FL (ref 80–94)
MONOCYTES # BLD AUTO: 0.58 X10(3)/MCL (ref 0.1–1.3)
MONOCYTES NFR BLD AUTO: 10.1 %
NEUTROPHILS # BLD AUTO: 3.23 X10(3)/MCL (ref 2.1–9.2)
NEUTROPHILS NFR BLD AUTO: 56.3 %
NRBC BLD AUTO-RTO: 0 %
PLATELET # BLD AUTO: 245 X10(3)/MCL (ref 130–400)
PMV BLD AUTO: 9.3 FL (ref 7.4–10.4)
RBC # BLD AUTO: 4.84 X10(6)/MCL (ref 4.7–6.1)
WBC # BLD AUTO: 5.74 X10(3)/MCL (ref 4.5–11.5)

## 2025-05-06 PROCEDURE — 36415 COLL VENOUS BLD VENIPUNCTURE: CPT

## 2025-05-06 PROCEDURE — 85025 COMPLETE CBC W/AUTO DIFF WBC: CPT

## 2025-05-07 ENCOUNTER — OFFICE VISIT (OUTPATIENT)
Dept: HEMATOLOGY/ONCOLOGY | Facility: CLINIC | Age: 50
End: 2025-05-07
Payer: COMMERCIAL

## 2025-05-07 VITALS — WEIGHT: 210 LBS | HEIGHT: 67 IN | BODY MASS INDEX: 32.96 KG/M2

## 2025-05-07 DIAGNOSIS — D69.3 IDIOPATHIC THROMBOCYTOPENIC PURPURA (ITP): Primary | ICD-10-CM

## 2025-05-07 PROCEDURE — 98006 SYNCH AUDIO-VIDEO EST MOD 30: CPT | Mod: 95,,, | Performed by: INTERNAL MEDICINE

## 2025-05-07 PROCEDURE — 1160F RVW MEDS BY RX/DR IN RCRD: CPT | Mod: CPTII,95,, | Performed by: INTERNAL MEDICINE

## 2025-05-07 PROCEDURE — 1159F MED LIST DOCD IN RCRD: CPT | Mod: CPTII,95,, | Performed by: INTERNAL MEDICINE

## 2025-05-07 RX ORDER — FLUTICASONE PROPIONATE 50 MCG
1 SPRAY, SUSPENSION (ML) NASAL
COMMUNITY
Start: 2025-04-23

## 2025-05-14 ENCOUNTER — LAB VISIT (OUTPATIENT)
Dept: LAB | Facility: HOSPITAL | Age: 50
End: 2025-05-14
Attending: INTERNAL MEDICINE
Payer: COMMERCIAL

## 2025-05-14 DIAGNOSIS — D69.3 IDIOPATHIC THROMBOCYTOPENIC PURPURA (ITP): ICD-10-CM

## 2025-05-14 LAB
BASOPHILS # BLD AUTO: 0.04 X10(3)/MCL
BASOPHILS NFR BLD AUTO: 0.8 %
EOSINOPHIL # BLD AUTO: 0.21 X10(3)/MCL (ref 0–0.9)
EOSINOPHIL NFR BLD AUTO: 4.2 %
ERYTHROCYTE [DISTWIDTH] IN BLOOD BY AUTOMATED COUNT: 12.4 % (ref 11.5–17)
HCT VFR BLD AUTO: 45.3 % (ref 42–52)
HGB BLD-MCNC: 15.6 G/DL (ref 14–18)
IMM GRANULOCYTES # BLD AUTO: 0.04 X10(3)/MCL (ref 0–0.04)
IMM GRANULOCYTES NFR BLD AUTO: 0.8 %
LYMPHOCYTES # BLD AUTO: 1.79 X10(3)/MCL (ref 0.6–4.6)
LYMPHOCYTES NFR BLD AUTO: 35.6 %
MCH RBC QN AUTO: 29.8 PG (ref 27–31)
MCHC RBC AUTO-ENTMCNC: 34.4 G/DL (ref 33–36)
MCV RBC AUTO: 86.5 FL (ref 80–94)
MONOCYTES # BLD AUTO: 0.58 X10(3)/MCL (ref 0.1–1.3)
MONOCYTES NFR BLD AUTO: 11.5 %
NEUTROPHILS # BLD AUTO: 2.37 X10(3)/MCL (ref 2.1–9.2)
NEUTROPHILS NFR BLD AUTO: 47.1 %
NRBC BLD AUTO-RTO: 0 %
PLATELET # BLD AUTO: 373 X10(3)/MCL (ref 130–400)
PMV BLD AUTO: 8.8 FL (ref 7.4–10.4)
RBC # BLD AUTO: 5.24 X10(6)/MCL (ref 4.7–6.1)
WBC # BLD AUTO: 5.03 X10(3)/MCL (ref 4.5–11.5)

## 2025-05-14 PROCEDURE — 85025 COMPLETE CBC W/AUTO DIFF WBC: CPT

## 2025-05-14 PROCEDURE — 36415 COLL VENOUS BLD VENIPUNCTURE: CPT

## 2025-05-21 ENCOUNTER — LAB VISIT (OUTPATIENT)
Dept: LAB | Facility: HOSPITAL | Age: 50
End: 2025-05-21
Attending: INTERNAL MEDICINE
Payer: COMMERCIAL

## 2025-05-21 DIAGNOSIS — D69.3 IDIOPATHIC THROMBOCYTOPENIC PURPURA (ITP): ICD-10-CM

## 2025-05-21 LAB
BASOPHILS # BLD AUTO: 0.08 X10(3)/MCL
BASOPHILS NFR BLD AUTO: 1.5 %
EOSINOPHIL # BLD AUTO: 0.08 X10(3)/MCL (ref 0–0.9)
EOSINOPHIL NFR BLD AUTO: 1.5 %
ERYTHROCYTE [DISTWIDTH] IN BLOOD BY AUTOMATED COUNT: 12.4 % (ref 11.5–17)
HCT VFR BLD AUTO: 43.4 % (ref 42–52)
HGB BLD-MCNC: 15 G/DL (ref 14–18)
IMM GRANULOCYTES # BLD AUTO: 0.03 X10(3)/MCL (ref 0–0.04)
IMM GRANULOCYTES NFR BLD AUTO: 0.5 %
LYMPHOCYTES # BLD AUTO: 1.68 X10(3)/MCL (ref 0.6–4.6)
LYMPHOCYTES NFR BLD AUTO: 30.7 %
MCH RBC QN AUTO: 29.6 PG (ref 27–31)
MCHC RBC AUTO-ENTMCNC: 34.6 G/DL (ref 33–36)
MCV RBC AUTO: 85.8 FL (ref 80–94)
MONOCYTES # BLD AUTO: 0.72 X10(3)/MCL (ref 0.1–1.3)
MONOCYTES NFR BLD AUTO: 13.1 %
NEUTROPHILS # BLD AUTO: 2.89 X10(3)/MCL (ref 2.1–9.2)
NEUTROPHILS NFR BLD AUTO: 52.7 %
NRBC BLD AUTO-RTO: 0 %
PLATELET # BLD AUTO: 269 X10(3)/MCL (ref 130–400)
PMV BLD AUTO: 8.9 FL (ref 7.4–10.4)
RBC # BLD AUTO: 5.06 X10(6)/MCL (ref 4.7–6.1)
WBC # BLD AUTO: 5.48 X10(3)/MCL (ref 4.5–11.5)

## 2025-05-21 PROCEDURE — 85025 COMPLETE CBC W/AUTO DIFF WBC: CPT

## 2025-05-21 PROCEDURE — 36415 COLL VENOUS BLD VENIPUNCTURE: CPT

## 2025-05-22 NOTE — PROGRESS NOTES
Subjective:       Patient ID: Darian Morales is a 49 y.o. male.    ITP--Diagnosed 24    Work-up:  24--peripheral smear with severe thrombocytopenia, occasional giant forms, no schistocytes or clumps; vitamin B12 371, folate 9.2, normal PT and PTT, fibrinogen normal, GAYLE negative, hepatitis panel and HIV negative, Platelet IgM strongly positive.  S/p Right axillary LN core needle biopsy on 24--No monotypic B-cell population, phenotypically abnormal T-cell population or blast cell population detected.     Imagin. CT C/A/P 24--Nonspecific lymphadenopathy seen in the axillary regions bilaterally, mild hepatomegaly and some hepatic steatosis.  2. CT brain w/o contrast 24--No acute intracranial abnormality identified.   3. CTA chest done at [facility] 24--enlarged right axillary nodes, with largest measuring 3cm, left axilla is unremarkable.  4. CT soft tissue neck w/ and w/o contrast done 5/3/24--No convincing acute abnormality.  Focal right parapharyngeal calcification is nonspecific, differential includes sialolithiasis versus sequela of chronic tonsillar inflammation.  5. US abdomen/liver done 5/3/24--Hyperechoic appearance of the liver is suggestive of steatosis, otherwise, unremarkable sonographic evaluation.  6. Esophagram on 24--Small hiatus hernia with Schatzki ring and to and fro motion of contrast within the esophagus.    Platelets:  24--70  24--86  24--55  24--83  24--54  24--97  24--74  10/02/24--63  24--90  25--115  25--7  04/15/25--38    Treatment history:  Platelet transfusion 24.  Decadron 40mg IV X 4 days completed 24 + IVIG X 2 days completed 24.  Recurrence Decadron 40mg oral daily X 4 days completed 25.    Current treatment plan:   Promacta started 25.  Decreased to QOD on 25  Decreased to 2 days a week on 25 ( and )    Chief Complaint: Other Misc (Pt reports  no concerns today./)    HPI  Patient presents for follow-up of ITP via telemedicine visit. Platelets remains normal on QOD of the Promacta. He feels good, no complaints. Recommend to go down to 2 days a week and he is in agreement.     History reviewed. No pertinent past medical history.   History reviewed. No pertinent surgical history.  Family History   Problem Relation Name Age of Onset    Diabetes Father Jimmy Morales     Heart disease Father Jimmy Morales     Diabetes Paternal Grandmother Mery Morales      Social History     Socioeconomic History    Marital status:    Tobacco Use    Smoking status: Never    Smokeless tobacco: Never   Substance and Sexual Activity    Alcohol use: Not Currently     Comment: occasionally    Drug use: Never    Sexual activity: Yes     Partners: Female     Birth control/protection: None     Social Drivers of Health     Financial Resource Strain: Low Risk  (4/20/2024)    Overall Financial Resource Strain (CARDIA)     Difficulty of Paying Living Expenses: Not very hard   Food Insecurity: No Food Insecurity (4/20/2024)    Hunger Vital Sign     Worried About Running Out of Food in the Last Year: Never true     Ran Out of Food in the Last Year: Never true   Transportation Needs: No Transportation Needs (4/20/2024)    PRAPARE - Transportation     Lack of Transportation (Medical): No     Lack of Transportation (Non-Medical): No   Stress: No Stress Concern Present (4/20/2024)    [employer] - Occupational Stress Questionnaire     Feeling of Stress : Not at all   Housing Stability: Low Risk  (4/20/2024)    Housing Stability Vital Sign     Unable to Pay for Housing in the Last Year: No     Homeless in the Last Year: No       Review of patient's allergies indicates:  No Known Allergies   Current Outpatient Medications on File Prior to Visit   Medication Sig Dispense Refill    eltrombopag olamine (PROMACTA) 50 MG Tab Take 1 tablet (50 mg total) by  "mouth once daily. Take on an empty stomach or with a low-calcium (<50 mg) meal 30 tablet 3    FLUoxetine 20 MG capsule       fluticasone propionate (FLONASE) 50 mcg/actuation nasal spray 1 spray by Each Nostril route.      omeprazole (PRILOSEC) 40 MG capsule Take 40 mg by mouth every morning.      [DISCONTINUED] dexAMETHasone (DECADRON) 4 MG Tab Take 10 tablets (40mg) by mouth daily x 4 days. (Patient not taking: Reported on 5/28/2025) 40 tablet 0    [DISCONTINUED] pantoprazole (PROTONIX) 40 MG tablet Take 1 tablet (40 mg total) by mouth once daily. (Patient not taking: Reported on 4/15/2025) 30 tablet 1     No current facility-administered medications on file prior to visit.      Review of Systems   Constitutional:  Negative for appetite change and unexpected weight change.   HENT:  Negative for mouth sores.    Eyes:  Negative for visual disturbance.   Respiratory:  Negative for cough and shortness of breath.    Cardiovascular:  Negative for chest pain.   Gastrointestinal:  Negative for abdominal pain and diarrhea.   Genitourinary:  Negative for frequency.   Musculoskeletal:  Negative for back pain.   Integumentary:  Negative for rash.   Neurological:  Negative for headaches.   Hematological:  Negative for adenopathy. Does not bruise/bleed easily.   Psychiatric/Behavioral:  The patient is not nervous/anxious.          Vitals:    05/28/25 1113   Weight: 94.3 kg (208 lb)   Height: 5' 7" (1.702 m)         Wt Readings from Last 3 Encounters:   05/28/25 1113 94.3 kg (208 lb)   05/07/25 0919 95.3 kg (210 lb)   04/15/25 1053 96.5 kg (212 lb 11.2 oz)     Physical Exam  Constitutional:       General: He is awake.      Appearance: Normal appearance. He is overweight.   HENT:      Head: Normocephalic.      Nose: Nose normal.   Eyes:      General: Lids are normal. Vision grossly intact.      Extraocular Movements: Extraocular movements intact.   Pulmonary:      Effort: Pulmonary effort is normal.   Musculoskeletal:      " Cervical back: Neck supple.   Skin:     General: Skin is moist.      Findings: No petechiae.   Neurological:      General: No focal deficit present.      Mental Status: He is alert and oriented to person, place, and time.   Psychiatric:         Attention and Perception: Attention normal.         Mood and Affect: Mood normal.         Speech: Speech normal.         Behavior: Behavior is cooperative.         Thought Content: Thought content normal.         Cognition and Memory: Cognition normal.         Judgment: Judgment normal.       Lab Visit on 05/28/2025   Component Date Value Ref Range Status    WBC 05/28/2025 6.13  4.50 - 11.50 x10(3)/mcL Final    RBC 05/28/2025 5.49  4.70 - 6.10 x10(6)/mcL Final    Hgb 05/28/2025 16.3  14.0 - 18.0 g/dL Final    Hct 05/28/2025 48.1  42.0 - 52.0 % Final    MCV 05/28/2025 87.6  80.0 - 94.0 fL Final    MCH 05/28/2025 29.7  27.0 - 31.0 pg Final    MCHC 05/28/2025 33.9  33.0 - 36.0 g/dL Final    RDW 05/28/2025 12.3  11.5 - 17.0 % Final    Platelet 05/28/2025 185  130 - 400 x10(3)/mcL Final    MPV 05/28/2025 9.7  7.4 - 10.4 fL Final    Neut % 05/28/2025 54.5  % Final    Lymph % 05/28/2025 30.3  % Final    Mono % 05/28/2025 11.1  % Final    Eos % 05/28/2025 2.4  % Final    Basophil % 05/28/2025 1.0  % Final    Imm Grans % 05/28/2025 0.7  % Final    Neut # 05/28/2025 3.34  2.1 - 9.2 x10(3)/mcL Final    Lymph # 05/28/2025 1.86  0.6 - 4.6 x10(3)/mcL Final    Mono # 05/28/2025 0.68  0.1 - 1.3 x10(3)/mcL Final    Eos # 05/28/2025 0.15  0 - 0.9 x10(3)/mcL Final    Baso # 05/28/2025 0.06  <=0.2 x10(3)/mcL Final    Imm Gran # 05/28/2025 0.04  0.00 - 0.04 x10(3)/mcL Final    NRBC% 05/28/2025 0.0  % Final         Assessment:       1. Idiopathic thrombocytopenic purpura (ITP)        Plan:       Patient with ITP, diagnosed 4/2024.  S/p IVIG X 2 days and 4 days of IV decadron 40mg completed 4/21/24.     CT C/A/P from 4/19/24 showed non-specific axillary adenopathy, but otherwise normal.   He had  another CTA chest at [facility] on 4/27/24 when he went back to ED for chest pain. They mentioned again the right axillary enlarged nodes, measuring up to 3cm.  Patient referred Dr. Sonu Lozano for biopsy, core needle was obtained in the office on 5/14/24. Pathology showed no monotypic B-cell population, phenotypically abnormal T-cell population or blast cell population detected.   Follow-up US of the right axilla on 5/28/24 revealed the 3 cm ovoid hypoechoic focus with central increased echogenicity suspicious for a prominent partially fatty lymph node.   No plans for any further testing since the lymph node was benign.     Patient with worsening of ITP 4/14/25 platelets 7K.  Completed course of Dexamethasone 40mg daily X 4 days on 4/17/25. Platelets improved to 63K but dropped again to 37K on 4/28/25.  Promacta 50mg daily started on 4/28/25. Platelets returned to normal.  Promacta changed to every other day on 5/7/25.    CBC with platelets normal 185. CMP ordered but not done.   Decrease Promacta to 2 days a week--Mondays and Fridays.     Continue weekly labs, check CMP as well next week.    F/u visit in 4 weeks.     All questions answered at this time.      Sheila Kuhn MD      This is a telemedicine note. Patient was treated using telemedicine, realtime audio and video, according to [facility] protocol. I, distant provider, conducted the visit from [facility]. The patient participated in the visit at a non-[facility] location selected by the patient, identified at his/her home. I am licensed in the state where the patient stated he or she was located. The patient stated that he/she understood and accepted the privacy and security risks to their information at their location. Total time spent in medical discussion with patient 11 minutes.

## 2025-05-28 ENCOUNTER — LAB VISIT (OUTPATIENT)
Dept: LAB | Facility: HOSPITAL | Age: 50
End: 2025-05-28
Attending: INTERNAL MEDICINE
Payer: COMMERCIAL

## 2025-05-28 ENCOUNTER — OFFICE VISIT (OUTPATIENT)
Dept: HEMATOLOGY/ONCOLOGY | Facility: CLINIC | Age: 50
End: 2025-05-28
Payer: COMMERCIAL

## 2025-05-28 VITALS — HEIGHT: 67 IN | WEIGHT: 208 LBS | BODY MASS INDEX: 32.65 KG/M2

## 2025-05-28 DIAGNOSIS — D69.3 IDIOPATHIC THROMBOCYTOPENIC PURPURA (ITP): ICD-10-CM

## 2025-05-28 DIAGNOSIS — D69.3 IDIOPATHIC THROMBOCYTOPENIC PURPURA (ITP): Primary | ICD-10-CM

## 2025-05-28 LAB
BASOPHILS # BLD AUTO: 0.06 X10(3)/MCL
BASOPHILS NFR BLD AUTO: 1 %
EOSINOPHIL # BLD AUTO: 0.15 X10(3)/MCL (ref 0–0.9)
EOSINOPHIL NFR BLD AUTO: 2.4 %
ERYTHROCYTE [DISTWIDTH] IN BLOOD BY AUTOMATED COUNT: 12.3 % (ref 11.5–17)
HCT VFR BLD AUTO: 48.1 % (ref 42–52)
HGB BLD-MCNC: 16.3 G/DL (ref 14–18)
IMM GRANULOCYTES # BLD AUTO: 0.04 X10(3)/MCL (ref 0–0.04)
IMM GRANULOCYTES NFR BLD AUTO: 0.7 %
LYMPHOCYTES # BLD AUTO: 1.86 X10(3)/MCL (ref 0.6–4.6)
LYMPHOCYTES NFR BLD AUTO: 30.3 %
MCH RBC QN AUTO: 29.7 PG (ref 27–31)
MCHC RBC AUTO-ENTMCNC: 33.9 G/DL (ref 33–36)
MCV RBC AUTO: 87.6 FL (ref 80–94)
MONOCYTES # BLD AUTO: 0.68 X10(3)/MCL (ref 0.1–1.3)
MONOCYTES NFR BLD AUTO: 11.1 %
NEUTROPHILS # BLD AUTO: 3.34 X10(3)/MCL (ref 2.1–9.2)
NEUTROPHILS NFR BLD AUTO: 54.5 %
NRBC BLD AUTO-RTO: 0 %
PLATELET # BLD AUTO: 185 X10(3)/MCL (ref 130–400)
PMV BLD AUTO: 9.7 FL (ref 7.4–10.4)
RBC # BLD AUTO: 5.49 X10(6)/MCL (ref 4.7–6.1)
WBC # BLD AUTO: 6.13 X10(3)/MCL (ref 4.5–11.5)

## 2025-05-28 PROCEDURE — 1160F RVW MEDS BY RX/DR IN RCRD: CPT | Mod: CPTII,95,, | Performed by: INTERNAL MEDICINE

## 2025-05-28 PROCEDURE — 36415 COLL VENOUS BLD VENIPUNCTURE: CPT

## 2025-05-28 PROCEDURE — 1159F MED LIST DOCD IN RCRD: CPT | Mod: CPTII,95,, | Performed by: INTERNAL MEDICINE

## 2025-05-28 PROCEDURE — 85025 COMPLETE CBC W/AUTO DIFF WBC: CPT

## 2025-05-28 PROCEDURE — 98006 SYNCH AUDIO-VIDEO EST MOD 30: CPT | Mod: 95,,, | Performed by: INTERNAL MEDICINE

## 2025-06-04 ENCOUNTER — LAB VISIT (OUTPATIENT)
Dept: LAB | Facility: HOSPITAL | Age: 50
End: 2025-06-04
Attending: INTERNAL MEDICINE
Payer: COMMERCIAL

## 2025-06-04 DIAGNOSIS — D69.3 IDIOPATHIC THROMBOCYTOPENIC PURPURA (ITP): ICD-10-CM

## 2025-06-04 LAB
BASOPHILS # BLD AUTO: 0.08 X10(3)/MCL
BASOPHILS NFR BLD AUTO: 1.1 %
EOSINOPHIL # BLD AUTO: 0.2 X10(3)/MCL (ref 0–0.9)
EOSINOPHIL NFR BLD AUTO: 2.8 %
ERYTHROCYTE [DISTWIDTH] IN BLOOD BY AUTOMATED COUNT: 12.1 % (ref 11.5–17)
HCT VFR BLD AUTO: 45.3 % (ref 42–52)
HGB BLD-MCNC: 15.6 G/DL (ref 14–18)
IMM GRANULOCYTES # BLD AUTO: 0.05 X10(3)/MCL (ref 0–0.04)
IMM GRANULOCYTES NFR BLD AUTO: 0.7 %
LYMPHOCYTES # BLD AUTO: 1.75 X10(3)/MCL (ref 0.6–4.6)
LYMPHOCYTES NFR BLD AUTO: 24.1 %
MCH RBC QN AUTO: 30 PG (ref 27–31)
MCHC RBC AUTO-ENTMCNC: 34.4 G/DL (ref 33–36)
MCV RBC AUTO: 87.1 FL (ref 80–94)
MONOCYTES # BLD AUTO: 0.67 X10(3)/MCL (ref 0.1–1.3)
MONOCYTES NFR BLD AUTO: 9.2 %
NEUTROPHILS # BLD AUTO: 4.5 X10(3)/MCL (ref 2.1–9.2)
NEUTROPHILS NFR BLD AUTO: 62.1 %
NRBC BLD AUTO-RTO: 0 %
PLATELET # BLD AUTO: 198 X10(3)/MCL (ref 130–400)
PMV BLD AUTO: 9 FL (ref 7.4–10.4)
RBC # BLD AUTO: 5.2 X10(6)/MCL (ref 4.7–6.1)
WBC # BLD AUTO: 7.25 X10(3)/MCL (ref 4.5–11.5)

## 2025-06-04 PROCEDURE — 85025 COMPLETE CBC W/AUTO DIFF WBC: CPT

## 2025-06-04 PROCEDURE — 36415 COLL VENOUS BLD VENIPUNCTURE: CPT

## 2025-06-12 ENCOUNTER — LAB VISIT (OUTPATIENT)
Dept: LAB | Facility: HOSPITAL | Age: 50
End: 2025-06-12
Attending: INTERNAL MEDICINE
Payer: COMMERCIAL

## 2025-06-12 DIAGNOSIS — D69.3 IDIOPATHIC THROMBOCYTOPENIC PURPURA (ITP): ICD-10-CM

## 2025-06-12 LAB
BASOPHILS # BLD AUTO: 0.06 X10(3)/MCL
BASOPHILS NFR BLD AUTO: 1.3 %
EOSINOPHIL # BLD AUTO: 0.18 X10(3)/MCL (ref 0–0.9)
EOSINOPHIL NFR BLD AUTO: 3.8 %
ERYTHROCYTE [DISTWIDTH] IN BLOOD BY AUTOMATED COUNT: 12.4 % (ref 11.5–17)
HCT VFR BLD AUTO: 44.4 % (ref 42–52)
HGB BLD-MCNC: 15.1 G/DL (ref 14–18)
IMM GRANULOCYTES # BLD AUTO: 0.02 X10(3)/MCL (ref 0–0.04)
IMM GRANULOCYTES NFR BLD AUTO: 0.4 %
LYMPHOCYTES # BLD AUTO: 1.46 X10(3)/MCL (ref 0.6–4.6)
LYMPHOCYTES NFR BLD AUTO: 30.5 %
MCH RBC QN AUTO: 29.8 PG (ref 27–31)
MCHC RBC AUTO-ENTMCNC: 34 G/DL (ref 33–36)
MCV RBC AUTO: 87.6 FL (ref 80–94)
MONOCYTES # BLD AUTO: 0.45 X10(3)/MCL (ref 0.1–1.3)
MONOCYTES NFR BLD AUTO: 9.4 %
NEUTROPHILS # BLD AUTO: 2.62 X10(3)/MCL (ref 2.1–9.2)
NEUTROPHILS NFR BLD AUTO: 54.6 %
NRBC BLD AUTO-RTO: 0 %
PLATELET # BLD AUTO: 217 X10(3)/MCL (ref 130–400)
PMV BLD AUTO: 9 FL (ref 7.4–10.4)
RBC # BLD AUTO: 5.07 X10(6)/MCL (ref 4.7–6.1)
WBC # BLD AUTO: 4.79 X10(3)/MCL (ref 4.5–11.5)

## 2025-06-12 PROCEDURE — 85025 COMPLETE CBC W/AUTO DIFF WBC: CPT

## 2025-06-12 PROCEDURE — 36415 COLL VENOUS BLD VENIPUNCTURE: CPT

## 2025-06-18 ENCOUNTER — LAB VISIT (OUTPATIENT)
Dept: LAB | Facility: HOSPITAL | Age: 50
End: 2025-06-18
Attending: INTERNAL MEDICINE
Payer: COMMERCIAL

## 2025-06-18 DIAGNOSIS — D69.3 IDIOPATHIC THROMBOCYTOPENIC PURPURA (ITP): ICD-10-CM

## 2025-06-18 LAB
ALBUMIN SERPL-MCNC: 4.1 G/DL (ref 3.5–5)
ALBUMIN/GLOB SERPL: 1.1 RATIO (ref 1.1–2)
ALP SERPL-CCNC: 73 UNIT/L (ref 40–150)
ALT SERPL-CCNC: 39 UNIT/L (ref 0–55)
ANION GAP SERPL CALC-SCNC: 8 MEQ/L
AST SERPL-CCNC: 23 UNIT/L (ref 11–45)
BASOPHILS # BLD AUTO: 0.06 X10(3)/MCL
BASOPHILS NFR BLD AUTO: 1.2 %
BILIRUB SERPL-MCNC: 0.8 MG/DL
BUN SERPL-MCNC: 16 MG/DL (ref 8.9–20.6)
CALCIUM SERPL-MCNC: 9.7 MG/DL (ref 8.4–10.2)
CHLORIDE SERPL-SCNC: 106 MMOL/L (ref 98–107)
CO2 SERPL-SCNC: 27 MMOL/L (ref 22–29)
CREAT SERPL-MCNC: 1.13 MG/DL (ref 0.72–1.25)
CREAT/UREA NIT SERPL: 14
EOSINOPHIL # BLD AUTO: 0.13 X10(3)/MCL (ref 0–0.9)
EOSINOPHIL NFR BLD AUTO: 2.6 %
ERYTHROCYTE [DISTWIDTH] IN BLOOD BY AUTOMATED COUNT: 12.3 % (ref 11.5–17)
GFR SERPLBLD CREATININE-BSD FMLA CKD-EPI: >60 ML/MIN/1.73/M2
GLOBULIN SER-MCNC: 3.8 GM/DL (ref 2.4–3.5)
GLUCOSE SERPL-MCNC: 116 MG/DL (ref 74–100)
HCT VFR BLD AUTO: 47.5 % (ref 42–52)
HGB BLD-MCNC: 16.2 G/DL (ref 14–18)
IMM GRANULOCYTES # BLD AUTO: 0.01 X10(3)/MCL (ref 0–0.04)
IMM GRANULOCYTES NFR BLD AUTO: 0.2 %
LYMPHOCYTES # BLD AUTO: 1.52 X10(3)/MCL (ref 0.6–4.6)
LYMPHOCYTES NFR BLD AUTO: 30.1 %
MCH RBC QN AUTO: 29.8 PG (ref 27–31)
MCHC RBC AUTO-ENTMCNC: 34.1 G/DL (ref 33–36)
MCV RBC AUTO: 87.5 FL (ref 80–94)
MONOCYTES # BLD AUTO: 0.49 X10(3)/MCL (ref 0.1–1.3)
MONOCYTES NFR BLD AUTO: 9.7 %
NEUTROPHILS # BLD AUTO: 2.84 X10(3)/MCL (ref 2.1–9.2)
NEUTROPHILS NFR BLD AUTO: 56.2 %
NRBC BLD AUTO-RTO: 0 %
PLATELET # BLD AUTO: 196 X10(3)/MCL (ref 130–400)
PMV BLD AUTO: 9.1 FL (ref 7.4–10.4)
POTASSIUM SERPL-SCNC: 3.6 MMOL/L (ref 3.5–5.1)
PROT SERPL-MCNC: 7.9 GM/DL (ref 6.4–8.3)
RBC # BLD AUTO: 5.43 X10(6)/MCL (ref 4.7–6.1)
SODIUM SERPL-SCNC: 141 MMOL/L (ref 136–145)
WBC # BLD AUTO: 5.05 X10(3)/MCL (ref 4.5–11.5)

## 2025-06-18 PROCEDURE — 80053 COMPREHEN METABOLIC PANEL: CPT

## 2025-06-18 PROCEDURE — 85025 COMPLETE CBC W/AUTO DIFF WBC: CPT

## 2025-06-18 PROCEDURE — 36415 COLL VENOUS BLD VENIPUNCTURE: CPT

## 2025-06-19 NOTE — PROGRESS NOTES
Subjective:       Patient ID: Darian Morales is a 50 y.o. male.    ITP--Diagnosed 24    Work-up:  24--peripheral smear with severe thrombocytopenia, occasional giant forms, no schistocytes or clumps; vitamin B12 371, folate 9.2, normal PT and PTT, fibrinogen normal, GAYLE negative, hepatitis panel and HIV negative, Platelet IgM strongly positive.  S/p Right axillary LN core needle biopsy on 24--No monotypic B-cell population, phenotypically abnormal T-cell population or blast cell population detected.     Imagin. CT C/A/P 24--Nonspecific lymphadenopathy seen in the axillary regions bilaterally, mild hepatomegaly and some hepatic steatosis.  2. CT brain w/o contrast 24--No acute intracranial abnormality identified.   3. CTA chest done at [facility] 24--enlarged right axillary nodes, with largest measuring 3cm, left axilla is unremarkable.  4. CT soft tissue neck w/ and w/o contrast done 5/3/24--No convincing acute abnormality.  Focal right parapharyngeal calcification is nonspecific, differential includes sialolithiasis versus sequela of chronic tonsillar inflammation.  5. US abdomen/liver done 5/3/24--Hyperechoic appearance of the liver is suggestive of steatosis, otherwise, unremarkable sonographic evaluation.  6. Esophagram on 24--Small hiatus hernia with Schatzki ring and to and fro motion of contrast within the esophagus.    Platelets:  24--70  24--86  24--55  24--83  24--54  24--97  24--74  10/02/24--63  24--90  25--115  25--7  04/15/25--38  25--373  25--166    Treatment history:  Platelet transfusion 24.  Decadron 40mg IV X 4 days completed 24 + IVIG X 2 days completed 24.  Recurrence Decadron 40mg oral daily X 4 days completed 25.    Current treatment plan:   Promacta started 25.  Decreased to QOD on 25  Decreased to 2 days a week on 25 ( and )  Decreased to  1 day a week on 6/25/25    Chief Complaint: Other Misc (Pt reports no concerns today./)    HPI  Patient presents for follow-up of ITP via telemedicine visit. Platelets remains normal on 2 days a week of the Promacta. He feels good, no complaints. Recommend to go down to 1 day a week and he is in agreement.     History reviewed. No pertinent past medical history.   History reviewed. No pertinent surgical history.  Family History   Problem Relation Name Age of Onset    Diabetes Father Jimmy Morales     Heart disease Father Jimmy Morales     Diabetes Paternal Grandmother Mery Morales      Social History     Socioeconomic History    Marital status:    Tobacco Use    Smoking status: Never    Smokeless tobacco: Never   Substance and Sexual Activity    Alcohol use: Not Currently     Comment: occasionally    Drug use: Never    Sexual activity: Yes     Partners: Female     Birth control/protection: None     Social Drivers of Health     Financial Resource Strain: Low Risk  (4/20/2024)    Overall Financial Resource Strain (CARDIA)     Difficulty of Paying Living Expenses: Not very hard   Food Insecurity: No Food Insecurity (4/20/2024)    Hunger Vital Sign     Worried About Running Out of Food in the Last Year: Never true     Ran Out of Food in the Last Year: Never true   Transportation Needs: No Transportation Needs (4/20/2024)    PRAPARE - Transportation     Lack of Transportation (Medical): No     Lack of Transportation (Non-Medical): No   Stress: No Stress Concern Present (4/20/2024)    [employer] - Occupational Stress Questionnaire     Feeling of Stress : Not at all   Housing Stability: Low Risk  (4/20/2024)    Housing Stability Vital Sign     Unable to Pay for Housing in the Last Year: No     Homeless in the Last Year: No       Review of patient's allergies indicates:  No Known Allergies   Current Outpatient Medications on File Prior to Visit   Medication Sig Dispense Refill     "eltrombopag olamine (PROMACTA) 50 MG Tab Take 1 tablet (50 mg total) by mouth once daily. Take on an empty stomach or with a low-calcium (<50 mg) meal 30 tablet 3    FLUoxetine 20 MG capsule       fluticasone propionate (FLONASE) 50 mcg/actuation nasal spray 1 spray by Each Nostril route.      omeprazole (PRILOSEC) 40 MG capsule Take 40 mg by mouth every morning.       No current facility-administered medications on file prior to visit.      Review of Systems   Constitutional:  Negative for appetite change and unexpected weight change.   HENT:  Negative for mouth sores.    Eyes:  Negative for visual disturbance.   Respiratory:  Negative for cough and shortness of breath.    Cardiovascular:  Negative for chest pain.   Gastrointestinal:  Negative for abdominal pain and diarrhea.   Genitourinary:  Negative for frequency.   Musculoskeletal:  Negative for back pain.   Integumentary:  Negative for rash.   Neurological:  Negative for headaches.   Hematological:  Negative for adenopathy. Does not bruise/bleed easily.   Psychiatric/Behavioral:  The patient is not nervous/anxious.          Vitals:    06/25/25 1253   Weight: 95.3 kg (210 lb)   Height: 5' 7" (1.702 m)       Wt Readings from Last 3 Encounters:   06/25/25 1253 95.3 kg (210 lb)   05/28/25 1113 94.3 kg (208 lb)   05/07/25 0919 95.3 kg (210 lb)     Physical Exam  Constitutional:       General: He is awake.      Appearance: Normal appearance. He is overweight.   HENT:      Head: Normocephalic.      Nose: Nose normal.   Eyes:      General: Lids are normal. Vision grossly intact.      Extraocular Movements: Extraocular movements intact.   Pulmonary:      Effort: Pulmonary effort is normal.   Musculoskeletal:      Cervical back: Neck supple.   Skin:     General: Skin is moist.      Findings: No petechiae.   Neurological:      General: No focal deficit present.      Mental Status: He is alert and oriented to person, place, and time.   Psychiatric:         Attention and " Perception: Attention normal.         Mood and Affect: Mood normal.         Speech: Speech normal.         Behavior: Behavior is cooperative.         Thought Content: Thought content normal.         Cognition and Memory: Cognition normal.         Judgment: Judgment normal.       Lab Visit on 06/25/2025   Component Date Value Ref Range Status    Sodium 06/25/2025 141  136 - 145 mmol/L Final    Potassium 06/25/2025 3.6  3.5 - 5.1 mmol/L Final    Chloride 06/25/2025 108 (H)  98 - 107 mmol/L Final    CO2 06/25/2025 26  22 - 29 mmol/L Final    Glucose 06/25/2025 111 (H)  74 - 100 mg/dL Final    Blood Urea Nitrogen 06/25/2025 14.0  8.9 - 20.6 mg/dL Final    Creatinine 06/25/2025 1.14  0.72 - 1.25 mg/dL Final    Calcium 06/25/2025 9.5  8.4 - 10.2 mg/dL Final    Protein Total 06/25/2025 7.7  6.4 - 8.3 gm/dL Final    Albumin 06/25/2025 4.0  3.5 - 5.0 g/dL Final    Globulin 06/25/2025 3.7 (H)  2.4 - 3.5 gm/dL Final    Albumin/Globulin Ratio 06/25/2025 1.1  1.1 - 2.0 ratio Final    Bilirubin Total 06/25/2025 0.7  <=1.5 mg/dL Final    ALP 06/25/2025 70  40 - 150 unit/L Final    ALT 06/25/2025 36  0 - 55 unit/L Final    AST 06/25/2025 20  11 - 45 unit/L Final    eGFR 06/25/2025 >60  mL/min/1.73/m2 Final    Estimated GFR calculated using the CKD-EPI creatinine (2021) equation.    Anion Gap 06/25/2025 7.0  mEq/L Final    BUN/Creatinine Ratio 06/25/2025 12   Final    WBC 06/25/2025 5.02  4.50 - 11.50 x10(3)/mcL Final    RBC 06/25/2025 5.24  4.70 - 6.10 x10(6)/mcL Final    Hgb 06/25/2025 15.6  14.0 - 18.0 g/dL Final    Hct 06/25/2025 45.6  42.0 - 52.0 % Final    MCV 06/25/2025 87.0  80.0 - 94.0 fL Final    MCH 06/25/2025 29.8  27.0 - 31.0 pg Final    MCHC 06/25/2025 34.2  33.0 - 36.0 g/dL Final    RDW 06/25/2025 12.2  11.5 - 17.0 % Final    Platelet 06/25/2025 166  130 - 400 x10(3)/mcL Final    MPV 06/25/2025 9.0  7.4 - 10.4 fL Final    Neut % 06/25/2025 49.7  % Final    Lymph % 06/25/2025 33.5  % Final    Mono % 06/25/2025 11.8  %  Detail Level: Detailed Final    Eos % 06/25/2025 3.4  % Final    Basophil % 06/25/2025 1.0  % Final    Imm Grans % 06/25/2025 0.6  % Final    Neut # 06/25/2025 2.50  2.1 - 9.2 x10(3)/mcL Final    Lymph # 06/25/2025 1.68  0.6 - 4.6 x10(3)/mcL Final    Mono # 06/25/2025 0.59  0.1 - 1.3 x10(3)/mcL Final    Eos # 06/25/2025 0.17  0 - 0.9 x10(3)/mcL Final    Baso # 06/25/2025 0.05  <=0.2 x10(3)/mcL Final    Imm Gran # 06/25/2025 0.03  0.00 - 0.04 x10(3)/mcL Final    NRBC% 06/25/2025 0.0  % Final         Assessment:       1. Idiopathic thrombocytopenic purpura (ITP)      Plan:       Patient with ITP, diagnosed 4/2024.  S/p IVIG X 2 days and 4 days of IV decadron 40mg completed 4/21/24.     CT C/A/P from 4/19/24 showed non-specific axillary adenopathy, but otherwise normal.   He had another CTA chest at [facility] on 4/27/24 when he went back to ED for chest pain. They mentioned again the right axillary enlarged nodes, measuring up to 3cm.  Patient referred Dr. Sonu Lozano for biopsy, core needle was obtained in the office on 5/14/24. Pathology showed no monotypic B-cell population, phenotypically abnormal T-cell population or blast cell population detected.   Follow-up US of the right axilla on 5/28/24 revealed the 3 cm ovoid hypoechoic focus with central increased echogenicity suspicious for a prominent partially fatty lymph node.   No plans for any further testing since the lymph node was benign.     Patient with worsening of ITP 4/14/25 platelets 7K.  Completed course of Dexamethasone 40mg daily X 4 days on 4/17/25. Platelets improved to 63K but dropped again to 37K on 4/28/25.  Promacta 50mg daily started on 4/28/25. Platelets returned to normal.  Promacta changed to every other day on 5/7/25.  Changed to 2 days a week on 5/28/25.    CBC with normal platelets. CMP good.     Decrease Promacta to 1 day a week--Mondays.     CBC in 2 weeks.    F/u visit in 4 weeks, telemedicine with CBC, CMP.     All questions answered at this time.      Sheila  MD Bam      This is a telemedicine note. Patient was treated using telemedicine, realtime audio and video, according to [facility] [place] protocol. I, distant provider, conducted the visit from [facility]. The patient participated in the visit at a non-[facility] location selected by the patient, identified at his/her home. I am licensed in the state where the patient stated he or she was located. The patient stated that he/she understood and accepted the privacy and security risks to their information at their location. Total time spent in medical discussion with patient 11 minutes.

## 2025-06-25 ENCOUNTER — LAB VISIT (OUTPATIENT)
Dept: LAB | Facility: HOSPITAL | Age: 50
End: 2025-06-25
Attending: INTERNAL MEDICINE
Payer: COMMERCIAL

## 2025-06-25 ENCOUNTER — OFFICE VISIT (OUTPATIENT)
Dept: HEMATOLOGY/ONCOLOGY | Facility: CLINIC | Age: 50
End: 2025-06-25
Payer: COMMERCIAL

## 2025-06-25 VITALS — WEIGHT: 210 LBS | BODY MASS INDEX: 32.96 KG/M2 | HEIGHT: 67 IN

## 2025-06-25 DIAGNOSIS — D69.3 IDIOPATHIC THROMBOCYTOPENIC PURPURA (ITP): Primary | ICD-10-CM

## 2025-06-25 DIAGNOSIS — D69.3 IDIOPATHIC THROMBOCYTOPENIC PURPURA (ITP): ICD-10-CM

## 2025-06-25 LAB
ALBUMIN SERPL-MCNC: 4 G/DL (ref 3.5–5)
ALBUMIN/GLOB SERPL: 1.1 RATIO (ref 1.1–2)
ALP SERPL-CCNC: 70 UNIT/L (ref 40–150)
ALT SERPL-CCNC: 36 UNIT/L (ref 0–55)
ANION GAP SERPL CALC-SCNC: 7 MEQ/L
AST SERPL-CCNC: 20 UNIT/L (ref 11–45)
BASOPHILS # BLD AUTO: 0.05 X10(3)/MCL
BASOPHILS NFR BLD AUTO: 1 %
BILIRUB SERPL-MCNC: 0.7 MG/DL
BUN SERPL-MCNC: 14 MG/DL (ref 8.9–20.6)
CALCIUM SERPL-MCNC: 9.5 MG/DL (ref 8.4–10.2)
CHLORIDE SERPL-SCNC: 108 MMOL/L (ref 98–107)
CO2 SERPL-SCNC: 26 MMOL/L (ref 22–29)
CREAT SERPL-MCNC: 1.14 MG/DL (ref 0.72–1.25)
CREAT/UREA NIT SERPL: 12
EOSINOPHIL # BLD AUTO: 0.17 X10(3)/MCL (ref 0–0.9)
EOSINOPHIL NFR BLD AUTO: 3.4 %
ERYTHROCYTE [DISTWIDTH] IN BLOOD BY AUTOMATED COUNT: 12.2 % (ref 11.5–17)
GFR SERPLBLD CREATININE-BSD FMLA CKD-EPI: >60 ML/MIN/1.73/M2
GLOBULIN SER-MCNC: 3.7 GM/DL (ref 2.4–3.5)
GLUCOSE SERPL-MCNC: 111 MG/DL (ref 74–100)
HCT VFR BLD AUTO: 45.6 % (ref 42–52)
HGB BLD-MCNC: 15.6 G/DL (ref 14–18)
IMM GRANULOCYTES # BLD AUTO: 0.03 X10(3)/MCL (ref 0–0.04)
IMM GRANULOCYTES NFR BLD AUTO: 0.6 %
LYMPHOCYTES # BLD AUTO: 1.68 X10(3)/MCL (ref 0.6–4.6)
LYMPHOCYTES NFR BLD AUTO: 33.5 %
MCH RBC QN AUTO: 29.8 PG (ref 27–31)
MCHC RBC AUTO-ENTMCNC: 34.2 G/DL (ref 33–36)
MCV RBC AUTO: 87 FL (ref 80–94)
MONOCYTES # BLD AUTO: 0.59 X10(3)/MCL (ref 0.1–1.3)
MONOCYTES NFR BLD AUTO: 11.8 %
NEUTROPHILS # BLD AUTO: 2.5 X10(3)/MCL (ref 2.1–9.2)
NEUTROPHILS NFR BLD AUTO: 49.7 %
NRBC BLD AUTO-RTO: 0 %
PLATELET # BLD AUTO: 166 X10(3)/MCL (ref 130–400)
PMV BLD AUTO: 9 FL (ref 7.4–10.4)
POTASSIUM SERPL-SCNC: 3.6 MMOL/L (ref 3.5–5.1)
PROT SERPL-MCNC: 7.7 GM/DL (ref 6.4–8.3)
RBC # BLD AUTO: 5.24 X10(6)/MCL (ref 4.7–6.1)
SODIUM SERPL-SCNC: 141 MMOL/L (ref 136–145)
WBC # BLD AUTO: 5.02 X10(3)/MCL (ref 4.5–11.5)

## 2025-06-25 PROCEDURE — 80053 COMPREHEN METABOLIC PANEL: CPT

## 2025-06-25 PROCEDURE — 98006 SYNCH AUDIO-VIDEO EST MOD 30: CPT | Mod: 95,,, | Performed by: INTERNAL MEDICINE

## 2025-06-25 PROCEDURE — 36415 COLL VENOUS BLD VENIPUNCTURE: CPT

## 2025-06-25 PROCEDURE — 85025 COMPLETE CBC W/AUTO DIFF WBC: CPT

## 2025-06-25 PROCEDURE — 1159F MED LIST DOCD IN RCRD: CPT | Mod: CPTII,95,, | Performed by: INTERNAL MEDICINE

## 2025-06-25 PROCEDURE — 1160F RVW MEDS BY RX/DR IN RCRD: CPT | Mod: CPTII,95,, | Performed by: INTERNAL MEDICINE

## 2025-07-08 ENCOUNTER — LAB VISIT (OUTPATIENT)
Dept: LAB | Facility: HOSPITAL | Age: 50
End: 2025-07-08
Attending: INTERNAL MEDICINE
Payer: COMMERCIAL

## 2025-07-08 DIAGNOSIS — D69.3 IDIOPATHIC THROMBOCYTOPENIC PURPURA (ITP): ICD-10-CM

## 2025-07-08 LAB
BASOPHILS # BLD AUTO: 0.07 X10(3)/MCL
BASOPHILS NFR BLD AUTO: 1.3 %
EOSINOPHIL # BLD AUTO: 0.17 X10(3)/MCL (ref 0–0.9)
EOSINOPHIL NFR BLD AUTO: 3.1 %
ERYTHROCYTE [DISTWIDTH] IN BLOOD BY AUTOMATED COUNT: 12.4 % (ref 11.5–17)
HCT VFR BLD AUTO: 45.1 % (ref 42–52)
HGB BLD-MCNC: 15.5 G/DL (ref 14–18)
IMM GRANULOCYTES # BLD AUTO: 0.01 X10(3)/MCL (ref 0–0.04)
IMM GRANULOCYTES NFR BLD AUTO: 0.2 %
LYMPHOCYTES # BLD AUTO: 1.71 X10(3)/MCL (ref 0.6–4.6)
LYMPHOCYTES NFR BLD AUTO: 31.1 %
MCH RBC QN AUTO: 30.1 PG (ref 27–31)
MCHC RBC AUTO-ENTMCNC: 34.4 G/DL (ref 33–36)
MCV RBC AUTO: 87.6 FL (ref 80–94)
MONOCYTES # BLD AUTO: 0.66 X10(3)/MCL (ref 0.1–1.3)
MONOCYTES NFR BLD AUTO: 12 %
NEUTROPHILS # BLD AUTO: 2.88 X10(3)/MCL (ref 2.1–9.2)
NEUTROPHILS NFR BLD AUTO: 52.3 %
NRBC BLD AUTO-RTO: 0 %
PLATELET # BLD AUTO: 164 X10(3)/MCL (ref 130–400)
PMV BLD AUTO: 9.1 FL (ref 7.4–10.4)
RBC # BLD AUTO: 5.15 X10(6)/MCL (ref 4.7–6.1)
WBC # BLD AUTO: 5.5 X10(3)/MCL (ref 4.5–11.5)

## 2025-07-08 PROCEDURE — 36415 COLL VENOUS BLD VENIPUNCTURE: CPT

## 2025-07-08 PROCEDURE — 85025 COMPLETE CBC W/AUTO DIFF WBC: CPT

## 2025-07-17 NOTE — PROGRESS NOTES
Subjective:       Patient ID: Darian Morales is a 50 y.o. male.    ITP--Diagnosed 24    Work-up:  24--peripheral smear with severe thrombocytopenia, occasional giant forms, no schistocytes or clumps; vitamin B12 371, folate 9.2, normal PT and PTT, fibrinogen normal, GAYLE negative, hepatitis panel and HIV negative, Platelet IgM strongly positive.  S/p Right axillary LN core needle biopsy on 24--No monotypic B-cell population, phenotypically abnormal T-cell population or blast cell population detected.     Imagin. CT C/A/P 24--Nonspecific lymphadenopathy seen in the axillary regions bilaterally, mild hepatomegaly and some hepatic steatosis.  2. CT brain w/o contrast 24--No acute intracranial abnormality identified.   3. [facility]A chest done at [facility] 24--enlarged right axillary nodes, with largest measuring 3cm, left axilla is unremarkable.  4. CT soft tissue neck w/ and w/o contrast done 5/3/24--No convincing acute abnormality.  Focal right parapharyngeal calcification is nonspecific, differential includes sialolithiasis versus sequela of chronic tonsillar inflammation.  5. US abdomen/liver done 5/3/24--Hyperechoic appearance of the liver is suggestive of steatosis, otherwise, unremarkable sonographic evaluation.  6. Esophagram on 24--Small hiatus hernia with Schatzki ring and to and fro motion of contrast within the esophagus.    Platelets:  24--70  24--86  24--55  24--83  24--54  24--97  24--74  10/02/24--63  24--90  25--115  25--7  04/15/25--38  25--373  25--166  25--164  25--167    Treatment history:  Platelet transfusion 24.  Decadron 40mg IV X 4 days completed 24 + IVIG X 2 days completed 24.  Recurrence Decadron 40mg oral daily X 4 days completed 25.    Current treatment plan:   Promacta started 25.  Decreased to QOD on 25  Decreased to 2 days a week on 25  (Mondays and Fridays)  Decreased to 1 day a week on 6/25/25    Chief Complaint: Other Misc (Pt reports no concerns today./)    HPI  Patient presents for follow-up of ITP via telemedicine visit. Promacta decreased to weekly in June. He is doing well and tolerating Promacta without any side effects. Platelets remains normal. He feels good, no complaints.     History reviewed. No pertinent past medical history.   History reviewed. No pertinent surgical history.  Family History   Problem Relation Name Age of Onset    Diabetes Father Jimmy Morales     Heart disease Father Jimmy Morales     Diabetes Paternal Grandmother Mery Morales      Social History     Socioeconomic History    Marital status:    Tobacco Use    Smoking status: Never    Smokeless tobacco: Never   Substance and Sexual Activity    Alcohol use: Not Currently     Comment: occasionally    Drug use: Never    Sexual activity: Yes     Partners: Female     Birth control/protection: None     Social Drivers of Health     Financial Resource Strain: Low Risk  (4/20/2024)    Overall Financial Resource Strain (CARDIA)     Difficulty of Paying Living Expenses: Not very hard   Food Insecurity: No Food Insecurity (4/20/2024)    Hunger Vital Sign     Worried About Running Out of Food in the Last Year: Never true     Ran Out of Food in the Last Year: Never true   Transportation Needs: No Transportation Needs (4/20/2024)    PRAPARE - Transportation     Lack of Transportation (Medical): No     Lack of Transportation (Non-Medical): No   Stress: No Stress Concern Present (4/20/2024)    [employer] - Occupational Stress Questionnaire     Feeling of Stress : Not at all   Housing Stability: Low Risk  (4/20/2024)    Housing Stability Vital Sign     Unable to Pay for Housing in the Last Year: No     Homeless in the Last Year: No       Review of patient's allergies indicates:  No Known Allergies   Current Outpatient Medications on File  "Prior to Visit   Medication Sig Dispense Refill    eltrombopag olamine (PROMACTA) 50 MG Tab Take 1 tablet (50 mg total) by mouth once daily. Take on an empty stomach or with a low-calcium (<50 mg) meal 30 tablet 3    FLUoxetine 20 MG capsule       fluticasone propionate (FLONASE) 50 mcg/actuation nasal spray 1 spray by Each Nostril route.      omeprazole (PRILOSEC) 40 MG capsule Take 40 mg by mouth every morning.       No current facility-administered medications on file prior to visit.      Review of Systems   Constitutional:  Negative for appetite change and unexpected weight change.   HENT:  Negative for mouth sores.    Eyes:  Negative for visual disturbance.   Respiratory:  Negative for cough and shortness of breath.    Cardiovascular:  Negative for chest pain.   Gastrointestinal:  Negative for abdominal pain and diarrhea.   Genitourinary:  Negative for frequency.   Musculoskeletal:  Negative for back pain.   Integumentary:  Negative for rash.   Neurological:  Negative for headaches.   Hematological:  Negative for adenopathy. Does not bruise/bleed easily.   Psychiatric/Behavioral:  The patient is not nervous/anxious.          Vitals:    07/23/25 1423   Weight: 94.3 kg (208 lb)   Height: 5' 7" (1.702 m)     Wt Readings from Last 3 Encounters:   07/23/25 1423 94.3 kg (208 lb)   06/25/25 1253 95.3 kg (210 lb)   05/28/25 1113 94.3 kg (208 lb)     Physical Exam  Constitutional:       General: He is awake.      Appearance: Normal appearance. He is overweight.   HENT:      Head: Normocephalic.      Nose: Nose normal.   Eyes:      General: Lids are normal.      Extraocular Movements: Extraocular movements intact.   Pulmonary:      Effort: Pulmonary effort is normal.   Musculoskeletal:      Cervical back: Neck supple.   Neurological:      General: No focal deficit present.      Mental Status: He is alert and oriented to person, place, and time.   Psychiatric:         Attention and Perception: Attention normal.         " Mood and Affect: Mood normal.         Speech: Speech normal.         Behavior: Behavior is cooperative.         Thought Content: Thought content normal.         Cognition and Memory: Cognition normal.         Judgment: Judgment normal.       Lab Visit on 07/23/2025   Component Date Value Ref Range Status    Sodium 07/23/2025 139  136 - 145 mmol/L Final    Potassium 07/23/2025 3.5  3.5 - 5.1 mmol/L Final    Chloride 07/23/2025 106  98 - 107 mmol/L Final    CO2 07/23/2025 27  22 - 29 mmol/L Final    Glucose 07/23/2025 99  74 - 100 mg/dL Final    Blood Urea Nitrogen 07/23/2025 16.0  8.9 - 20.6 mg/dL Final    Creatinine 07/23/2025 1.09  0.72 - 1.25 mg/dL Final    Calcium 07/23/2025 9.5  8.4 - 10.2 mg/dL Final    Protein Total 07/23/2025 7.8  6.4 - 8.3 gm/dL Final    Albumin 07/23/2025 4.0  3.5 - 5.0 g/dL Final    Globulin 07/23/2025 3.8 (H)  2.4 - 3.5 gm/dL Final    Albumin/Globulin Ratio 07/23/2025 1.1  1.1 - 2.0 ratio Final    Bilirubin Total 07/23/2025 0.8  <=1.5 mg/dL Final    ALP 07/23/2025 60  40 - 150 unit/L Final    ALT 07/23/2025 37  0 - 55 unit/L Final    AST 07/23/2025 22  11 - 45 unit/L Final    eGFR 07/23/2025 >60  mL/min/1.73/m2 Final    Estimated GFR calculated using the CKD-EPI creatinine (2021) equation.    Anion Gap 07/23/2025 6.0  mEq/L Final    BUN/Creatinine Ratio 07/23/2025 15   Final    WBC 07/23/2025 4.94  4.50 - 11.50 x10(3)/mcL Final    RBC 07/23/2025 5.30  4.70 - 6.10 x10(6)/mcL Final    Hgb 07/23/2025 15.8  14.0 - 18.0 g/dL Final    Hct 07/23/2025 45.8  42.0 - 52.0 % Final    MCV 07/23/2025 86.4  80.0 - 94.0 fL Final    MCH 07/23/2025 29.8  27.0 - 31.0 pg Final    MCHC 07/23/2025 34.5  33.0 - 36.0 g/dL Final    RDW 07/23/2025 12.1  11.5 - 17.0 % Final    Platelet 07/23/2025 167  130 - 400 x10(3)/mcL Final    MPV 07/23/2025 9.1  7.4 - 10.4 fL Final    Neut % 07/23/2025 46.3  % Final    Lymph % 07/23/2025 36.2  % Final    Mono % 07/23/2025 12.1  % Final    Eos % 07/23/2025 3.8  % Final     Basophil % 07/23/2025 1.0  % Final    Imm Grans % 07/23/2025 0.6  % Final    Neut # 07/23/2025 2.28  2.1 - 9.2 x10(3)/mcL Final    Lymph # 07/23/2025 1.79  0.6 - 4.6 x10(3)/mcL Final    Mono # 07/23/2025 0.60  0.1 - 1.3 x10(3)/mcL Final    Eos # 07/23/2025 0.19  0 - 0.9 x10(3)/mcL Final    Baso # 07/23/2025 0.05  <=0.2 x10(3)/mcL Final    Imm Gran # 07/23/2025 0.03  0.00 - 0.04 x10(3)/mcL Final    NRBC% 07/23/2025 0.0  % Final         Assessment:       1. Idiopathic thrombocytopenic purpura (ITP)      Plan:       Patient with ITP, diagnosed 4/2024.  S/p IVIG X 2 days and 4 days of IV decadron 40mg completed 4/21/24.     CT C/A/P from 4/19/24 showed non-specific axillary adenopathy, but otherwise normal.   He had another CTA chest at [facility] on 4/27/24 when he went back to ED for chest pain. They mentioned again the right axillary enlarged nodes, measuring up to 3cm.  Patient referred Dr. Sonu Lozano for biopsy, core needle was obtained in the office on 5/14/24. Pathology showed no monotypic B-cell population, phenotypically abnormal T-cell population or blast cell population detected.   Follow-up US of the right axilla on 5/28/24 revealed the 3 cm ovoid hypoechoic focus with central increased echogenicity suspicious for a prominent partially fatty lymph node.   No plans for any further testing since the lymph node was benign.     Patient with worsening of ITP 4/14/25 platelets 7K.  Completed course of Dexamethasone 40mg daily X 4 days on 4/17/25. Platelets improved to 63K but dropped again to 37K on 4/28/25.  Promacta 50mg daily started on 4/28/25. Platelets returned to normal.  Promacta changed to every other day on 5/7/25.  Changed to 2 days a week on 5/28/25.  Changed to weekly on Mondays on 6/25/25.   CBC with normal platelets.  F/u visit in 4 weeks, telemedicine with CBC, CMP.     All questions answered at this time.      BHAVANI Liang      This is a telemedicine note. Patient was treated using  telemedicine, realtime audio and video, according to [facility] protocol. I, distant provider, conducted the visit from [facility]. The patient participated in the visit at a non-[facility] location selected by the patient, identified at his home. I am licensed in the state where the patient stated he was located. The patient stated that he understood and accepted the privacy and security risks to their information at their location. Total time spent in medical discussion with patient 11 minutes.

## 2025-07-23 ENCOUNTER — OFFICE VISIT (OUTPATIENT)
Dept: HEMATOLOGY/ONCOLOGY | Facility: CLINIC | Age: 50
End: 2025-07-23
Payer: COMMERCIAL

## 2025-07-23 ENCOUNTER — LAB VISIT (OUTPATIENT)
Dept: LAB | Facility: HOSPITAL | Age: 50
End: 2025-07-23
Attending: INTERNAL MEDICINE
Payer: COMMERCIAL

## 2025-07-23 VITALS — WEIGHT: 208 LBS | HEIGHT: 67 IN | BODY MASS INDEX: 32.65 KG/M2

## 2025-07-23 DIAGNOSIS — D69.3 IDIOPATHIC THROMBOCYTOPENIC PURPURA (ITP): Primary | ICD-10-CM

## 2025-07-23 DIAGNOSIS — D69.3 IDIOPATHIC THROMBOCYTOPENIC PURPURA (ITP): ICD-10-CM

## 2025-07-23 LAB
ALBUMIN SERPL-MCNC: 4 G/DL (ref 3.5–5)
ALBUMIN/GLOB SERPL: 1.1 RATIO (ref 1.1–2)
ALP SERPL-CCNC: 60 UNIT/L (ref 40–150)
ALT SERPL-CCNC: 37 UNIT/L (ref 0–55)
ANION GAP SERPL CALC-SCNC: 6 MEQ/L
AST SERPL-CCNC: 22 UNIT/L (ref 11–45)
BASOPHILS # BLD AUTO: 0.05 X10(3)/MCL
BASOPHILS NFR BLD AUTO: 1 %
BILIRUB SERPL-MCNC: 0.8 MG/DL
BUN SERPL-MCNC: 16 MG/DL (ref 8.9–20.6)
CALCIUM SERPL-MCNC: 9.5 MG/DL (ref 8.4–10.2)
CHLORIDE SERPL-SCNC: 106 MMOL/L (ref 98–107)
CO2 SERPL-SCNC: 27 MMOL/L (ref 22–29)
CREAT SERPL-MCNC: 1.09 MG/DL (ref 0.72–1.25)
CREAT/UREA NIT SERPL: 15
EOSINOPHIL # BLD AUTO: 0.19 X10(3)/MCL (ref 0–0.9)
EOSINOPHIL NFR BLD AUTO: 3.8 %
ERYTHROCYTE [DISTWIDTH] IN BLOOD BY AUTOMATED COUNT: 12.1 % (ref 11.5–17)
GFR SERPLBLD CREATININE-BSD FMLA CKD-EPI: >60 ML/MIN/1.73/M2
GLOBULIN SER-MCNC: 3.8 GM/DL (ref 2.4–3.5)
GLUCOSE SERPL-MCNC: 99 MG/DL (ref 74–100)
HCT VFR BLD AUTO: 45.8 % (ref 42–52)
HGB BLD-MCNC: 15.8 G/DL (ref 14–18)
IMM GRANULOCYTES # BLD AUTO: 0.03 X10(3)/MCL (ref 0–0.04)
IMM GRANULOCYTES NFR BLD AUTO: 0.6 %
LYMPHOCYTES # BLD AUTO: 1.79 X10(3)/MCL (ref 0.6–4.6)
LYMPHOCYTES NFR BLD AUTO: 36.2 %
MCH RBC QN AUTO: 29.8 PG (ref 27–31)
MCHC RBC AUTO-ENTMCNC: 34.5 G/DL (ref 33–36)
MCV RBC AUTO: 86.4 FL (ref 80–94)
MONOCYTES # BLD AUTO: 0.6 X10(3)/MCL (ref 0.1–1.3)
MONOCYTES NFR BLD AUTO: 12.1 %
NEUTROPHILS # BLD AUTO: 2.28 X10(3)/MCL (ref 2.1–9.2)
NEUTROPHILS NFR BLD AUTO: 46.3 %
NRBC BLD AUTO-RTO: 0 %
PLATELET # BLD AUTO: 167 X10(3)/MCL (ref 130–400)
PMV BLD AUTO: 9.1 FL (ref 7.4–10.4)
POTASSIUM SERPL-SCNC: 3.5 MMOL/L (ref 3.5–5.1)
PROT SERPL-MCNC: 7.8 GM/DL (ref 6.4–8.3)
RBC # BLD AUTO: 5.3 X10(6)/MCL (ref 4.7–6.1)
SODIUM SERPL-SCNC: 139 MMOL/L (ref 136–145)
WBC # BLD AUTO: 4.94 X10(3)/MCL (ref 4.5–11.5)

## 2025-07-23 PROCEDURE — 80053 COMPREHEN METABOLIC PANEL: CPT

## 2025-07-23 PROCEDURE — 85025 COMPLETE CBC W/AUTO DIFF WBC: CPT

## 2025-07-23 PROCEDURE — 1159F MED LIST DOCD IN RCRD: CPT | Mod: CPTII,95,, | Performed by: NURSE PRACTITIONER

## 2025-07-23 PROCEDURE — 36415 COLL VENOUS BLD VENIPUNCTURE: CPT

## 2025-07-23 PROCEDURE — 98006 SYNCH AUDIO-VIDEO EST MOD 30: CPT | Mod: 95,,, | Performed by: NURSE PRACTITIONER

## 2025-07-23 PROCEDURE — 1160F RVW MEDS BY RX/DR IN RCRD: CPT | Mod: CPTII,95,, | Performed by: NURSE PRACTITIONER

## 2025-08-27 ENCOUNTER — LAB VISIT (OUTPATIENT)
Dept: LAB | Facility: HOSPITAL | Age: 50
End: 2025-08-27
Attending: NURSE PRACTITIONER
Payer: COMMERCIAL

## 2025-08-27 ENCOUNTER — OFFICE VISIT (OUTPATIENT)
Dept: HEMATOLOGY/ONCOLOGY | Facility: CLINIC | Age: 50
End: 2025-08-27
Payer: COMMERCIAL

## 2025-08-27 DIAGNOSIS — D69.3 IDIOPATHIC THROMBOCYTOPENIC PURPURA (ITP): ICD-10-CM

## 2025-08-27 LAB
ALBUMIN SERPL-MCNC: 4 G/DL (ref 3.5–5)
ALBUMIN/GLOB SERPL: 1.1 RATIO (ref 1.1–2)
ALP SERPL-CCNC: 78 UNIT/L (ref 40–150)
ALT SERPL-CCNC: 45 UNIT/L (ref 0–55)
ANION GAP SERPL CALC-SCNC: 8 MEQ/L
AST SERPL-CCNC: 27 UNIT/L (ref 11–45)
BASOPHILS # BLD AUTO: 0.03 X10(3)/MCL
BASOPHILS NFR BLD AUTO: 0.6 %
BILIRUB SERPL-MCNC: 0.5 MG/DL
BUN SERPL-MCNC: 16 MG/DL (ref 8.9–20.6)
CALCIUM SERPL-MCNC: 9.4 MG/DL (ref 8.4–10.2)
CHLORIDE SERPL-SCNC: 106 MMOL/L (ref 98–107)
CO2 SERPL-SCNC: 26 MMOL/L (ref 22–29)
CREAT SERPL-MCNC: 1.15 MG/DL (ref 0.72–1.25)
CREAT/UREA NIT SERPL: 14
EOSINOPHIL # BLD AUTO: 0.14 X10(3)/MCL (ref 0–0.9)
EOSINOPHIL NFR BLD AUTO: 2.9 %
ERYTHROCYTE [DISTWIDTH] IN BLOOD BY AUTOMATED COUNT: 11.8 % (ref 11.5–17)
GFR SERPLBLD CREATININE-BSD FMLA CKD-EPI: >60 ML/MIN/1.73/M2
GLOBULIN SER-MCNC: 3.6 GM/DL (ref 2.4–3.5)
GLUCOSE SERPL-MCNC: 96 MG/DL (ref 74–100)
HCT VFR BLD AUTO: 46.2 % (ref 42–52)
HGB BLD-MCNC: 15.6 G/DL (ref 14–18)
IMM GRANULOCYTES # BLD AUTO: 0.02 X10(3)/MCL (ref 0–0.04)
IMM GRANULOCYTES NFR BLD AUTO: 0.4 %
LYMPHOCYTES # BLD AUTO: 1.6 X10(3)/MCL (ref 0.6–4.6)
LYMPHOCYTES NFR BLD AUTO: 33.3 %
MCH RBC QN AUTO: 29.4 PG (ref 27–31)
MCHC RBC AUTO-ENTMCNC: 33.8 G/DL (ref 33–36)
MCV RBC AUTO: 87.2 FL (ref 80–94)
MONOCYTES # BLD AUTO: 0.56 X10(3)/MCL (ref 0.1–1.3)
MONOCYTES NFR BLD AUTO: 11.7 %
NEUTROPHILS # BLD AUTO: 2.45 X10(3)/MCL (ref 2.1–9.2)
NEUTROPHILS NFR BLD AUTO: 51.1 %
PLATELET # BLD AUTO: 79 X10(3)/MCL (ref 130–400)
PMV BLD AUTO: 9.8 FL (ref 7.4–10.4)
POTASSIUM SERPL-SCNC: 3.7 MMOL/L (ref 3.5–5.1)
PROT SERPL-MCNC: 7.6 GM/DL (ref 6.4–8.3)
RBC # BLD AUTO: 5.3 X10(6)/MCL (ref 4.7–6.1)
SODIUM SERPL-SCNC: 140 MMOL/L (ref 136–145)
WBC # BLD AUTO: 4.8 X10(3)/MCL (ref 4.5–11.5)

## 2025-08-27 PROCEDURE — 1160F RVW MEDS BY RX/DR IN RCRD: CPT | Mod: CPTII,95,, | Performed by: NURSE PRACTITIONER

## 2025-08-27 PROCEDURE — 98006 SYNCH AUDIO-VIDEO EST MOD 30: CPT | Mod: 95,,, | Performed by: NURSE PRACTITIONER

## 2025-08-27 PROCEDURE — 1159F MED LIST DOCD IN RCRD: CPT | Mod: CPTII,95,, | Performed by: NURSE PRACTITIONER

## 2025-08-27 PROCEDURE — 36415 COLL VENOUS BLD VENIPUNCTURE: CPT

## 2025-08-27 PROCEDURE — 85025 COMPLETE CBC W/AUTO DIFF WBC: CPT

## 2025-08-27 PROCEDURE — 80053 COMPREHEN METABOLIC PANEL: CPT
